# Patient Record
Sex: MALE | Race: WHITE | URBAN - METROPOLITAN AREA
[De-identification: names, ages, dates, MRNs, and addresses within clinical notes are randomized per-mention and may not be internally consistent; named-entity substitution may affect disease eponyms.]

---

## 2023-08-03 LAB
ALANINE AMINOTRANSFERASE (SGPT) (U/L) IN SER/PLAS: 16 U/L (ref 10–52)
ALBUMIN (G/DL) IN SER/PLAS: 3.9 G/DL (ref 3.4–5)
ALKALINE PHOSPHATASE (U/L) IN SER/PLAS: 78 U/L (ref 33–136)
ANION GAP IN SER/PLAS: 15 MMOL/L (ref 10–20)
ASPARTATE AMINOTRANSFERASE (SGOT) (U/L) IN SER/PLAS: 21 U/L (ref 9–39)
BILIRUBIN TOTAL (MG/DL) IN SER/PLAS: 0.8 MG/DL (ref 0–1.2)
CALCIUM (MG/DL) IN SER/PLAS: 8.6 MG/DL (ref 8.6–10.3)
CARBON DIOXIDE, TOTAL (MMOL/L) IN SER/PLAS: 24 MMOL/L (ref 21–32)
CHLORIDE (MMOL/L) IN SER/PLAS: 104 MMOL/L (ref 98–107)
CHOLESTEROL (MG/DL) IN SER/PLAS: 141 MG/DL (ref 0–199)
CHOLESTEROL IN HDL (MG/DL) IN SER/PLAS: 40.2 MG/DL
CHOLESTEROL IN LDL (MG/DL) IN SER/PLAS BY DIRECT ASSAY: 93 MG/DL (ref 0–129)
CHOLESTEROL/HDL RATIO: 3.5
CREATININE (MG/DL) IN SER/PLAS: 1.06 MG/DL (ref 0.5–1.3)
ERYTHROCYTE DISTRIBUTION WIDTH (RATIO) BY AUTOMATED COUNT: 16 % (ref 11.5–14.5)
ERYTHROCYTE MEAN CORPUSCULAR HEMOGLOBIN CONCENTRATION (G/DL) BY AUTOMATED: 31.1 G/DL (ref 32–36)
ERYTHROCYTE MEAN CORPUSCULAR VOLUME (FL) BY AUTOMATED COUNT: 84 FL (ref 80–100)
ERYTHROCYTES (10*6/UL) IN BLOOD BY AUTOMATED COUNT: 4.97 X10E12/L (ref 4.5–5.9)
GFR MALE: 74 ML/MIN/1.73M2
GLUCOSE (MG/DL) IN SER/PLAS: 94 MG/DL (ref 74–99)
HEMATOCRIT (%) IN BLOOD BY AUTOMATED COUNT: 41.8 % (ref 41–52)
HEMOGLOBIN (G/DL) IN BLOOD: 13 G/DL (ref 13.5–17.5)
LDL: 79 MG/DL (ref 0–99)
LEUKOCYTES (10*3/UL) IN BLOOD BY AUTOMATED COUNT: 6.5 X10E9/L (ref 4.4–11.3)
PLATELETS (10*3/UL) IN BLOOD AUTOMATED COUNT: 146 X10E9/L (ref 150–450)
POTASSIUM (MMOL/L) IN SER/PLAS: 4 MMOL/L (ref 3.5–5.3)
PROSTATE SPECIFIC AG (NG/ML) IN SER/PLAS: 3.17 NG/ML (ref 0–4)
PROTEIN TOTAL: 6.4 G/DL (ref 6.4–8.2)
SODIUM (MMOL/L) IN SER/PLAS: 139 MMOL/L (ref 136–145)
TRIGLYCERIDE (MG/DL) IN SER/PLAS: 110 MG/DL (ref 0–149)
UREA NITROGEN (MG/DL) IN SER/PLAS: 10 MG/DL (ref 6–23)
VLDL: 22 MG/DL (ref 0–40)

## 2023-09-20 PROBLEM — F32.A ANXIETY AND DEPRESSION: Status: ACTIVE | Noted: 2023-09-20

## 2023-09-20 PROBLEM — R06.02 SHORTNESS OF BREATH ON EXERTION: Status: ACTIVE | Noted: 2023-09-20

## 2023-09-20 PROBLEM — G47.33 OSA (OBSTRUCTIVE SLEEP APNEA): Status: ACTIVE | Noted: 2023-09-20

## 2023-09-20 PROBLEM — R00.0 TACHYCARDIA: Status: ACTIVE | Noted: 2023-09-20

## 2023-09-20 PROBLEM — E78.5 HYPERLIPIDEMIA: Status: ACTIVE | Noted: 2023-09-20

## 2023-09-20 PROBLEM — F41.9 ANXIETY AND DEPRESSION: Status: ACTIVE | Noted: 2023-09-20

## 2023-09-20 PROBLEM — I10 BENIGN ESSENTIAL HYPERTENSION: Status: ACTIVE | Noted: 2023-09-20

## 2023-09-20 PROBLEM — R94.31 ABNORMAL ECG: Status: ACTIVE | Noted: 2023-09-20

## 2023-09-20 PROBLEM — M10.9 GOUT: Status: ACTIVE | Noted: 2023-09-20

## 2023-09-20 RX ORDER — BUPROPION HYDROCHLORIDE 75 MG/1
75 TABLET ORAL SEE ADMIN INSTRUCTIONS
COMMUNITY
Start: 2020-06-03 | End: 2024-03-14 | Stop reason: WASHOUT

## 2023-09-20 RX ORDER — ASPIRIN 81 MG/1
1 TABLET ORAL DAILY
COMMUNITY
Start: 2020-06-03

## 2023-09-20 RX ORDER — COLCHICINE 0.6 MG/1
0.6 CAPSULE ORAL DAILY
COMMUNITY
Start: 2020-06-03 | End: 2024-03-14 | Stop reason: WASHOUT

## 2023-09-20 RX ORDER — ATORVASTATIN CALCIUM 20 MG/1
1 TABLET, FILM COATED ORAL NIGHTLY
COMMUNITY
Start: 2020-06-22

## 2023-09-20 RX ORDER — DULOXETIN HYDROCHLORIDE 60 MG/1
1 CAPSULE, DELAYED RELEASE ORAL 2 TIMES DAILY
COMMUNITY
Start: 2020-05-11 | End: 2024-03-14 | Stop reason: WASHOUT

## 2023-09-20 RX ORDER — METOPROLOL SUCCINATE 50 MG/1
50 TABLET, EXTENDED RELEASE ORAL 2 TIMES DAILY
COMMUNITY
Start: 2022-01-25 | End: 2024-02-07 | Stop reason: SDUPTHER

## 2023-09-20 RX ORDER — TURMERIC ROOT EXTRACT 500 MG
2000 TABLET ORAL DAILY
COMMUNITY
Start: 2020-06-03 | End: 2024-03-14 | Stop reason: WASHOUT

## 2023-09-20 RX ORDER — TAMSULOSIN HYDROCHLORIDE 0.4 MG/1
0.4 CAPSULE ORAL DAILY
COMMUNITY
Start: 2020-04-27

## 2023-09-20 RX ORDER — LISINOPRIL 40 MG/1
1 TABLET ORAL DAILY
COMMUNITY
Start: 2020-05-06

## 2023-09-20 RX ORDER — AMLODIPINE BESYLATE 2.5 MG/1
1 TABLET ORAL DAILY
COMMUNITY
Start: 2022-01-25 | End: 2024-05-08 | Stop reason: SDUPTHER

## 2023-09-20 RX ORDER — INDOMETHACIN 50 MG/1
50 CAPSULE ORAL DAILY PRN
COMMUNITY
Start: 2020-04-22 | End: 2024-03-14 | Stop reason: WASHOUT

## 2024-02-07 DIAGNOSIS — R00.0 TACHYCARDIA: ICD-10-CM

## 2024-02-07 DIAGNOSIS — I10 BENIGN ESSENTIAL HYPERTENSION: Primary | ICD-10-CM

## 2024-02-07 RX ORDER — MIRTAZAPINE 30 MG/1
30 TABLET, FILM COATED ORAL
COMMUNITY
Start: 2024-01-06 | End: 2024-03-14 | Stop reason: WASHOUT

## 2024-02-07 RX ORDER — CLONAZEPAM 2 MG/1
2 TABLET ORAL DAILY
COMMUNITY
Start: 2024-01-15

## 2024-02-07 RX ORDER — METOPROLOL TARTRATE 50 MG/1
TABLET ORAL
COMMUNITY
Start: 2024-01-30 | End: 2024-03-14 | Stop reason: WASHOUT

## 2024-02-07 RX ORDER — CITALOPRAM 20 MG/1
20 TABLET, FILM COATED ORAL
COMMUNITY
Start: 2024-01-29

## 2024-02-07 RX ORDER — METOPROLOL SUCCINATE 50 MG/1
50 TABLET, EXTENDED RELEASE ORAL 2 TIMES DAILY
Qty: 180 TABLET | Refills: 3 | Status: SHIPPED | OUTPATIENT
Start: 2024-02-07 | End: 2024-03-14 | Stop reason: SDUPTHER

## 2024-02-13 ENCOUNTER — APPOINTMENT (OUTPATIENT)
Dept: CARDIOLOGY | Facility: CLINIC | Age: 74
End: 2024-02-13
Payer: MEDICARE

## 2024-03-07 PROBLEM — H33.20 DETACHED RETINA: Chronic | Status: ACTIVE | Noted: 2021-03-31

## 2024-03-07 RX ORDER — PREDNISONE 10 MG/1
10 TABLET ORAL DAILY
COMMUNITY
End: 2024-03-14 | Stop reason: WASHOUT

## 2024-03-07 RX ORDER — TRIAMCINOLONE ACETONIDE 1 MG/G
CREAM TOPICAL
COMMUNITY
End: 2024-03-14 | Stop reason: WASHOUT

## 2024-03-07 RX ORDER — SIMVASTATIN 40 MG/1
40 TABLET, FILM COATED ORAL
COMMUNITY
End: 2024-03-14 | Stop reason: WASHOUT

## 2024-03-07 RX ORDER — ESZOPICLONE 3 MG/1
3 TABLET, FILM COATED ORAL
COMMUNITY
End: 2024-03-14 | Stop reason: WASHOUT

## 2024-03-07 RX ORDER — TRAZODONE HYDROCHLORIDE 50 MG/1
50 TABLET ORAL NIGHTLY
COMMUNITY
End: 2024-03-14 | Stop reason: WASHOUT

## 2024-03-07 RX ORDER — VERAPAMIL HYDROCHLORIDE 240 MG/1
240 TABLET, FILM COATED, EXTENDED RELEASE ORAL DAILY
COMMUNITY
End: 2024-03-14 | Stop reason: WASHOUT

## 2024-03-07 RX ORDER — TRAMADOL HYDROCHLORIDE 50 MG/1
50 TABLET ORAL EVERY 6 HOURS PRN
COMMUNITY
End: 2024-03-14 | Stop reason: WASHOUT

## 2024-03-11 NOTE — PROGRESS NOTES
"Counseling:  The patient was counseled regarding diagnostic results, instructions for management, risk factor reductions, prognosis, patient and family education, impressions, risks and benefits of treatment options and importance of compliance with treatment.      Chief Complaint:   The patient presents today for overdue annual followup of HTN and dyslipidemia.      History Of Present Illness:    Jhon Blair is a 73 year old male patient who presents today for overdue annual followup of HTN and dyslipidemia. His PMH is significant for anxiety/depression, hyperlipidemia, HTN, PHIL and h/o COVID infection. Over the past year, the patient states that he has done well from a cardiac standpoint. He denies any CP, chest discomfort or SOB. He reports a 2-3 week history of skipped beats when checking his pulse. He denies any palpitations, dizziness or palpitations. He denies increased intake of caffeinated beverages. The patient reports increased psychosocial stressors. He is currently on carbidopa-levodopa for management of restless legs, and in fact this was recently increased from once daily to twice daily. BP has been stable. EKG today shows NSR with occasional PACs. The patient is compliant with his prescribed medications. CBC drawn 08/03/2023 showed a low platelet count of 146. Per the patient, he voluntarily donates platelets on a regular basis.     Last Recorded Vitals:  Vitals:    03/14/24 1116 03/14/24 1132   BP: 104/78    BP Location: Left arm    Pulse: 55 62   Weight: 101 kg (223 lb)    Height: 1.778 m (5' 10\")        Past Surgical History:  He has a past surgical history that includes Other surgical history (07/22/2021).      Social History:  He reports that he has never smoked. He has never used smokeless tobacco. He reports that he does not currently use alcohol. He reports that he does not use drugs.    Family History:  Family History   Problem Relation Name Age of Onset    Heart attack Father        "   Allergies:  Patient has no known allergies.    Outpatient Medications:  Current Outpatient Medications   Medication Instructions    ALPRAZolam (XANAX) 1 mg, oral, Every 8 hours    amLODIPine (Norvasc) 2.5 mg tablet 1 tablet, oral, Daily    aspirin 81 mg EC tablet 1 tablet, oral, Daily    atorvastatin (Lipitor) 20 mg tablet 1 tablet, oral, Nightly    buPROPion (WELLBUTRIN) 75 mg, oral, See admin instructions, TAKE 75 mg in the morning and 100 mg in the evening<BR>    buPROPion SR (WELLBUTRIN SR) 150 mg, oral, 2 times daily    busPIRone (BUSPAR) 10 mg, oral, 2 times daily    carbidopa-levodopa (Sinemet CR)  mg ER tablet 1 tablet, 2 times daily    citalopram (CELEXA) 20 mg, oral, Daily before breakfast    clonazePAM (KLONOPIN) 2 mg, oral, Daily    colchicine (MITIGARE) 0.6 mg, oral, Daily    DULoxetine (Cymbalta) 60 mg DR capsule 1 capsule, oral, 2 times daily    eszopiclone (LUNESTA) 3 mg    indomethacin (INDOCIN) 50 mg, oral, Daily PRN    lisinopril 40 mg tablet 1 tablet, oral, Daily    lisinopril 20 mg, oral, Daily    metoprolol succinate XL (TOPROL-XL) 50 mg, oral, 2 times daily    metoprolol tartrate (Lopressor) 50 mg tablet     mirtazapine (REMERON) 30 mg    NON FORMULARY 2 Doses, oral, Every Day, PhysoTru Dietary supplement 2.4 G    predniSONE (DELTASONE) 10 mg, oral, Daily    QUEtiapine (SEROQUEL) 25 mg, oral    sildenafil (VIAGRA) 100 mg, oral, As needed, 1 HOUR BEFORE SEXUAL ACTIVITY    simvastatin (ZOCOR) 40 mg, oral    tamsulosin (FLOMAX) 0.4 mg, oral, Daily    traMADol (ULTRAM) 50 mg, oral, Every 6 hours PRN    traZODone (DESYREL) 50 mg, oral, Nightly    triamcinolone (Kenalog) 0.1 % cream DONTA EXT AA BID    turmeric root extract 2,000 mg, oral, Daily    verapamil SR (CALAN-SR) 240 mg, oral, Daily    vit C/zinc gluconat/elderberry (SAMBUCUS ELDERBERRY, ZINC GLU, ORAL) 1 capsule, oral, Every Day     Review of Systems   Cardiovascular:         Palpable skipped beats   Psychiatric/Behavioral:           Psychosocial stress   All other systems reviewed and are negative.     Physical Exam:  Constitutional:       Appearance: Healthy appearance. Not in distress.   Neck:      Vascular: No JVR. JVD normal.   Pulmonary:      Effort: Pulmonary effort is normal.      Breath sounds: Normal breath sounds. No wheezing. No rhonchi. No rales.   Chest:      Chest wall: Not tender to palpatation.   Cardiovascular:      PMI at left midclavicular line. Normal rate. Regular rhythm. Normal S1. Normal S2.       Murmurs: There is no murmur.      No gallop.  No click. No rub.   Pulses:     Intact distal pulses.   Edema:     Peripheral edema absent.   Abdominal:      General: Bowel sounds are normal.      Palpations: Abdomen is soft.      Tenderness: There is no abdominal tenderness.   Musculoskeletal: Normal range of motion.         General: No tenderness. Skin:     General: Skin is warm and dry.   Neurological:      General: No focal deficit present.      Mental Status: Alert and oriented to person, place and time.          Last Labs:  CBC -  Lab Results   Component Value Date    WBC 6.5 08/03/2023    HGB 13.0 (L) 08/03/2023    HCT 41.8 08/03/2023    MCV 84 08/03/2023     (L) 08/03/2023       CMP -  Lab Results   Component Value Date    CALCIUM 8.6 08/03/2023    PROT 6.4 08/03/2023    ALBUMIN 3.9 08/03/2023    AST 21 08/03/2023    ALT 16 08/03/2023    ALKPHOS 78 08/03/2023    BILITOT 0.8 08/03/2023       LIPID PANEL -   Lab Results   Component Value Date    CHOL 141 08/03/2023    TRIG 110 08/03/2023    HDL 40.2 08/03/2023    CHHDL 3.5 08/03/2023    LDLF 79 08/03/2023    VLDL 22 08/03/2023       RENAL FUNCTION PANEL -   Lab Results   Component Value Date    GLUCOSE 94 08/03/2023     08/03/2023    K 4.0 08/03/2023     08/03/2023    CO2 24 08/03/2023    ANIONGAP 15 08/03/2023    BUN 10 08/03/2023    CREATININE 1.06 08/03/2023    GFRMALE 74 08/03/2023    CALCIUM 8.6 08/03/2023    ALBUMIN 3.9 08/03/2023          Last  Cardiology Tests:  07/10/2020 - NM Cardiac Stress Test  1. Probably normal myocardial perfusion scan with no ischemia seen. Some diaphragm attenuation artifact along the inferior wall is noted. There is well-preserved LV function.  2. EKG Portion: No clinical or electrocardiographic evidence for ischemia at maximal infusion. Normal Stress Test.     07/02/2020 - CT Cardiac Scoring  1. Coronary artery calcium score of 23.  2. Few small right-sided pulmonary nodules measuring up to 5 mm, likely benign. No further follow-up is required, however, if the patient has high risk factors for primary lung malignancy, follow-up noncontrast CT scan chest in 12 months may be obtained.      06/23/2020 - TTE  1. The left ventricular systolic function is normal with a 65% estimated ejection fraction.  2. The left ventricular posterior wall thickness is moderately increased.  3. RVSP within normal limits.  4. Aortic valve stenosis is not present.     04/02/20- CT Chest Abd Pelv- Patchy groundglass infiltrates. This is nonspecific but could be indicative of a viral pneumonia. 4mm right middle lobe lung nodule. This is too small to further characterize. In a low risk patient. Enlarged pulmonary arteries consistent with chronic pulmonary hypertension. Residual contrast in the colon. Cholelithiasis. The gallbladder wall remains normal. Bilateral renal cysts.     Lab review: I have personally reviewed the laboratory result(s).    Assessment/Plan   1) HTN  Stable  Continue lisinopril 40 mg daily, metoprolol succinate XL 50 mg BID, amlodipine 2.5 mg daily in the evening  Decrease metoprolol from BID to once daily s/t bradycardia      2) Exercise Intolerance s/p COVID Infection with Tachycardia  Echo with no structural heart disease     3) Risk Factors for CAD  CT Calcium Score was 23 in July 2020, but stress test negative for ischemia July 2020  Continue atorvastatin 20 mg daily, lisinopril 40 mg daily, metoprolol succinate XL 50 mg BID,  amlodipine 2.5 mg daily in the evening  Lipid panel 08/03/2023 with LDL of 79   CBC 08/03/2023 with low platelets of 146 - voluntarily donates platelets on a regular basis  Denies CP, chest discomfort or SOB  Reports skipped beats when checking pulse  Denies palpitations, dizziness, lightheadedness  Denies increased caffeine intake  On carbidopa-levodopa for restless legs - recently increased from once daily to twice daily   Recommend discussing with PCP about switching from carbidopa-levodopa to Requip   Decrease metoprolol from BID to once daily in the evening  Check 7-day Holter  F/U 1 month       Scribe Attestation  By signing my name below, I, Tyshawn Mckee   attest that this documentation has been prepared under the direction and in the presence of Rylan Razo MD.

## 2024-03-12 ENCOUNTER — TELEPHONE (OUTPATIENT)
Dept: CARDIOLOGY | Facility: CLINIC | Age: 74
End: 2024-03-12
Payer: MEDICARE

## 2024-03-12 NOTE — TELEPHONE ENCOUNTER
----- Message from Rosa Vivas sent at 3/12/2024  2:44 PM EDT -----  Regarding: Skipping Beats  Hi Georgina - patient is being seen by Dr. Razo this Thurs 3/14 for an annual follow up. He said his heart has been skipping beats and wanted to know if he should come in for an EKG prior to his appt on 3/14. He said the [facility] hooked him up to an EKG machine prior to donating platelets and that's how they discovered that his heart skipped 11 times in 60 seconds. I said I would inform Dr. Razo's team and if it was concerning we'd call with recommendation. Thank you

## 2024-03-13 NOTE — TELEPHONE ENCOUNTER
Patient's phone # has been disconnected. Unable to reach patient and no MyChart access. Will discuss at appointment tomorrow.

## 2024-03-14 ENCOUNTER — ANCILLARY PROCEDURE (OUTPATIENT)
Dept: CARDIOLOGY | Facility: CLINIC | Age: 74
End: 2024-03-14
Payer: MEDICARE

## 2024-03-14 ENCOUNTER — OFFICE VISIT (OUTPATIENT)
Dept: CARDIOLOGY | Facility: CLINIC | Age: 74
End: 2024-03-14
Payer: MEDICARE

## 2024-03-14 VITALS
SYSTOLIC BLOOD PRESSURE: 104 MMHG | WEIGHT: 223 LBS | HEIGHT: 70 IN | HEART RATE: 62 BPM | DIASTOLIC BLOOD PRESSURE: 78 MMHG | BODY MASS INDEX: 31.92 KG/M2

## 2024-03-14 DIAGNOSIS — R00.0 TACHYCARDIA: ICD-10-CM

## 2024-03-14 DIAGNOSIS — I10 BENIGN ESSENTIAL HYPERTENSION: ICD-10-CM

## 2024-03-14 DIAGNOSIS — I10 BENIGN ESSENTIAL HYPERTENSION: Primary | ICD-10-CM

## 2024-03-14 PROCEDURE — 1160F RVW MEDS BY RX/DR IN RCRD: CPT | Performed by: INTERNAL MEDICINE

## 2024-03-14 PROCEDURE — 3078F DIAST BP <80 MM HG: CPT | Performed by: INTERNAL MEDICINE

## 2024-03-14 PROCEDURE — 1036F TOBACCO NON-USER: CPT | Performed by: INTERNAL MEDICINE

## 2024-03-14 PROCEDURE — 1159F MED LIST DOCD IN RCRD: CPT | Performed by: INTERNAL MEDICINE

## 2024-03-14 PROCEDURE — 99213 OFFICE O/P EST LOW 20 MIN: CPT | Performed by: INTERNAL MEDICINE

## 2024-03-14 PROCEDURE — 93000 ELECTROCARDIOGRAM COMPLETE: CPT | Performed by: INTERNAL MEDICINE

## 2024-03-14 PROCEDURE — 3074F SYST BP LT 130 MM HG: CPT | Performed by: INTERNAL MEDICINE

## 2024-03-14 RX ORDER — METOPROLOL SUCCINATE 50 MG/1
50 TABLET, EXTENDED RELEASE ORAL DAILY
Qty: 90 TABLET | Refills: 3 | Status: SHIPPED | OUTPATIENT
Start: 2024-03-14 | End: 2025-03-14

## 2024-03-14 ASSESSMENT — ENCOUNTER SYMPTOMS
LOSS OF SENSATION IN FEET: 0
OCCASIONAL FEELINGS OF UNSTEADINESS: 0
DEPRESSION: 1

## 2024-03-14 NOTE — LETTER
March 14, 2024     Clayton Wayne Seiple, DO  [address]  [facility]  [facility] [address]  [facility] [address]    Patient: Jhon Blair   YOB: 1950   Date of Visit: 3/14/2024       Dear Dr. Clayton Wayne Seiple, DO:    Thank you for referring Jhon Blair to me for evaluation. Below are my notes for this consultation.  If you have questions, please do not hesitate to call me. I look forward to following your patient along with you.       Sincerely,     Rylan Razo MD      CC: No Recipients  ______________________________________________________________________________________    Counseling:  The patient was counseled regarding diagnostic results, instructions for management, risk factor reductions, prognosis, patient and family education, impressions, risks and benefits of treatment options and importance of compliance with treatment.      Chief Complaint:   The patient presents today for overdue annual followup of HTN and dyslipidemia.      History Of Present Illness:    Jhon Blair is a 73 year old male patient who presents today for overdue annual followup of HTN and dyslipidemia. His PMH is significant for anxiety/depression, hyperlipidemia, HTN, PHIL and h/o COVID infection. Over the past year, the patient states that he has done well from a cardiac standpoint. He denies any CP, chest discomfort or SOB. He reports a 2-3 week history of skipped beats when checking his pulse. He denies any palpitations, dizziness or palpitations. He denies increased intake of caffeinated beverages. The patient reports increased psychosocial stressors. He is currently on carbidopa-levodopa for management of restless legs, and in fact this was recently increased from once daily to twice daily. BP has been stable. EKG today shows NSR with occasional PACs. The patient is compliant with his prescribed medications. CBC drawn 08/03/2023 showed a low platelet count of 146. Per the patient, he voluntarily donates platelets on a  "regular basis.     Last Recorded Vitals:  Vitals:    03/14/24 1116 03/14/24 1132   BP: 104/78    BP Location: Left arm    Pulse: 55 62   Weight: 101 kg (223 lb)    Height: 1.778 m (5' 10\")        Past Surgical History:  He has a past surgical history that includes Other surgical history (07/22/2021).      Social History:  He reports that he has never smoked. He has never used smokeless tobacco. He reports that he does not currently use alcohol. He reports that he does not use drugs.    Family History:  Family History   Problem Relation Name Age of Onset   • Heart attack Father          Allergies:  Patient has no known allergies.    Outpatient Medications:  Current Outpatient Medications   Medication Instructions   • ALPRAZolam (XANAX) 1 mg, oral, Every 8 hours   • amLODIPine (Norvasc) 2.5 mg tablet 1 tablet, oral, Daily   • aspirin 81 mg EC tablet 1 tablet, oral, Daily   • atorvastatin (Lipitor) 20 mg tablet 1 tablet, oral, Nightly   • buPROPion (WELLBUTRIN) 75 mg, oral, See admin instructions, TAKE 75 mg in the morning and 100 mg in the evening<BR>   • buPROPion SR (WELLBUTRIN SR) 150 mg, oral, 2 times daily   • busPIRone (BUSPAR) 10 mg, oral, 2 times daily   • carbidopa-levodopa (Sinemet CR)  mg ER tablet 1 tablet, 2 times daily   • citalopram (CELEXA) 20 mg, oral, Daily before breakfast   • clonazePAM (KLONOPIN) 2 mg, oral, Daily   • colchicine (MITIGARE) 0.6 mg, oral, Daily   • DULoxetine (Cymbalta) 60 mg DR capsule 1 capsule, oral, 2 times daily   • eszopiclone (LUNESTA) 3 mg   • indomethacin (INDOCIN) 50 mg, oral, Daily PRN   • lisinopril 40 mg tablet 1 tablet, oral, Daily   • lisinopril 20 mg, oral, Daily   • metoprolol succinate XL (TOPROL-XL) 50 mg, oral, 2 times daily   • metoprolol tartrate (Lopressor) 50 mg tablet    • mirtazapine (REMERON) 30 mg   • NON FORMULARY 2 Doses, oral, Every Day, PhysoTru Dietary supplement 2.4 G   • predniSONE (DELTASONE) 10 mg, oral, Daily   • QUEtiapine (SEROQUEL) 25 " mg, oral   • sildenafil (VIAGRA) 100 mg, oral, As needed, 1 HOUR BEFORE SEXUAL ACTIVITY   • simvastatin (ZOCOR) 40 mg, oral   • tamsulosin (FLOMAX) 0.4 mg, oral, Daily   • traMADol (ULTRAM) 50 mg, oral, Every 6 hours PRN   • traZODone (DESYREL) 50 mg, oral, Nightly   • triamcinolone (Kenalog) 0.1 % cream DONTA EXT AA BID   • turmeric root extract 2,000 mg, oral, Daily   • verapamil SR (CALAN-SR) 240 mg, oral, Daily   • vit C/zinc gluconat/elderberry (SAMBUCUS ELDERBERRY, ZINC GLU, ORAL) 1 capsule, oral, Every Day     Review of Systems   Cardiovascular:         Palpable skipped beats   Psychiatric/Behavioral:          Psychosocial stress   All other systems reviewed and are negative.     Physical Exam:  Constitutional:       Appearance: Healthy appearance. Not in distress.   Neck:      Vascular: No JVR. JVD normal.   Pulmonary:      Effort: Pulmonary effort is normal.      Breath sounds: Normal breath sounds. No wheezing. No rhonchi. No rales.   Chest:      Chest wall: Not tender to palpatation.   Cardiovascular:      PMI at left midclavicular line. Normal rate. Regular rhythm. Normal S1. Normal S2.       Murmurs: There is no murmur.      No gallop.  No click. No rub.   Pulses:     Intact distal pulses.   Edema:     Peripheral edema absent.   Abdominal:      General: Bowel sounds are normal.      Palpations: Abdomen is soft.      Tenderness: There is no abdominal tenderness.   Musculoskeletal: Normal range of motion.         General: No tenderness. Skin:     General: Skin is warm and dry.   Neurological:      General: No focal deficit present.      Mental Status: Alert and oriented to person, place and time.          Last Labs:  CBC -  Lab Results   Component Value Date    WBC 6.5 08/03/2023    HGB 13.0 (L) 08/03/2023    HCT 41.8 08/03/2023    MCV 84 08/03/2023     (L) 08/03/2023       CMP -  Lab Results   Component Value Date    CALCIUM 8.6 08/03/2023    PROT 6.4 08/03/2023    ALBUMIN 3.9 08/03/2023    AST 21  08/03/2023    ALT 16 08/03/2023    ALKPHOS 78 08/03/2023    BILITOT 0.8 08/03/2023       LIPID PANEL -   Lab Results   Component Value Date    CHOL 141 08/03/2023    TRIG 110 08/03/2023    HDL 40.2 08/03/2023    CHHDL 3.5 08/03/2023    LDLF 79 08/03/2023    VLDL 22 08/03/2023       RENAL FUNCTION PANEL -   Lab Results   Component Value Date    GLUCOSE 94 08/03/2023     08/03/2023    K 4.0 08/03/2023     08/03/2023    CO2 24 08/03/2023    ANIONGAP 15 08/03/2023    BUN 10 08/03/2023    CREATININE 1.06 08/03/2023    GFRMALE 74 08/03/2023    CALCIUM 8.6 08/03/2023    ALBUMIN 3.9 08/03/2023          Last Cardiology Tests:  07/10/2020 - NM Cardiac Stress Test  1. Probably normal myocardial perfusion scan with no ischemia seen. Some diaphragm attenuation artifact along the inferior wall is noted. There is well-preserved LV function.  2. EKG Portion: No clinical or electrocardiographic evidence for ischemia at maximal infusion. Normal Stress Test.     07/02/2020 - CT Cardiac Scoring  1. Coronary artery calcium score of 23.  2. Few small right-sided pulmonary nodules measuring up to 5 mm, likely benign. No further follow-up is required, however, if the patient has high risk factors for primary lung malignancy, follow-up noncontrast CT scan chest in 12 months may be obtained.      06/23/2020 - TTE  1. The left ventricular systolic function is normal with a 65% estimated ejection fraction.  2. The left ventricular posterior wall thickness is moderately increased.  3. RVSP within normal limits.  4. Aortic valve stenosis is not present.     04/02/20- CT Chest Abd Pelv- Patchy groundglass infiltrates. This is nonspecific but could be indicative of a viral pneumonia. 4mm right middle lobe lung nodule. This is too small to further characterize. In a low risk patient. Enlarged pulmonary arteries consistent with chronic pulmonary hypertension. Residual contrast in the colon. Cholelithiasis. The gallbladder wall remains  normal. Bilateral renal cysts.     Lab review: I have personally reviewed the laboratory result(s).    Assessment/Plan  1) HTN  Stable  Continue lisinopril 40 mg daily, metoprolol succinate XL 50 mg BID, amlodipine 2.5 mg daily in the evening  Decrease metoprolol from BID to once daily s/t bradycardia      2) Exercise Intolerance s/p COVID Infection with Tachycardia  Echo with no structural heart disease     3) Risk Factors for CAD  CT Calcium Score was 23 in July 2020, but stress test negative for ischemia July 2020  Continue atorvastatin 20 mg daily, lisinopril 40 mg daily, metoprolol succinate XL 50 mg BID, amlodipine 2.5 mg daily in the evening  Lipid panel 08/03/2023 with LDL of 79   CBC 08/03/2023 with low platelets of 146 - voluntarily donates platelets on a regular basis  Denies CP, chest discomfort or SOB  Reports skipped beats when checking pulse  Denies palpitations, dizziness, lightheadedness  Denies increased caffeine intake  On carbidopa-levodopa for restless legs - recently increased from once daily to twice daily   Recommend discussing with PCP about switching from carbidopa-levodopa to Requip   Decrease metoprolol from BID to once daily in the evening  Check 7-day Holter  F/U 1 month       Scribe Attestation  By signing my name below, I, Tyshawn Mckee   attest that this documentation has been prepared under the direction and in the presence of Rylan Razo MD.

## 2024-03-14 NOTE — PATIENT INSTRUCTIONS
Decrease metoprolol from twice daily to once daily in the evening. A prescription for the new dosing frequency has been sent to your pharmacy.   Continue all other medications as prescribed. Dr. Razo has recommended that you discuss with your primary care provider about switching from carbidopa-levodopa to an alternative such as Requip.   Dr. Razo has ordered a heart monitor to assess your heart rhythm.   Followup with Dr. Razo in 1 month.    If you have any questions or cardiac concerns, please call our office at [phone].

## 2024-03-27 LAB — BODY SURFACE AREA: 2.24 M2

## 2024-03-28 ENCOUNTER — TELEPHONE (OUTPATIENT)
Dept: CARDIOLOGY | Facility: CLINIC | Age: 74
End: 2024-03-28
Payer: MEDICARE

## 2024-03-28 RX ORDER — ACETAMINOPHEN 500 MG
1000 TABLET ORAL NIGHTLY
COMMUNITY

## 2024-03-28 RX ORDER — CARBIDOPA AND LEVODOPA 50; 200 MG/1; MG/1
1 TABLET, EXTENDED RELEASE ORAL NIGHTLY
COMMUNITY
End: 2024-04-15 | Stop reason: WASHOUT

## 2024-04-14 NOTE — PROGRESS NOTES
"Counseling:  The patient was counseled regarding diagnostic results, instructions for management, risk factor reductions, prognosis, patient and family education, impressions, risks and benefits of treatment options and importance of compliance with treatment.      Chief Complaint:   The patient presents today for 1-month followup of bradycardia and skipped beats s/p Holter monitor.     History Of Present Illness:    Jhon Blair is a 73 year old male patient who presents today for 1-month followup of bradycardia and skipped beats s/p Holter monitor. His PMH is significant for anxiety/depression, hyperlipidemia, HTN, PHIL and h/o COVID infection. Holter monitoring performed from 03/14/2024 to 03/21/2024 revealed 30 runs of SVT, SVE burden of 13.2% and VE burden of 3%. Today, the patient states that he feels improved since last being seen, reporting that his bradycardia has improved with the reduction in his metoprolol dose. He has also been switched from carbidopa-levodopa to Requip, which has been just as effective in managing his restless legs. He reports still experiencing skipped beats, but denies any palpitations or fluttering.      Last Recorded Vitals:  Vitals:    04/15/24 1313   BP: 136/86   BP Location: Left arm   Pulse: 56   Weight: 104 kg (229 lb)   Height: 1.778 m (5' 10\")       Past Surgical History:  He has a past surgical history that includes Other surgical history (07/22/2021).      Social History:  He reports that he has never smoked. He has never used smokeless tobacco. He reports that he does not currently use alcohol. He reports that he does not use drugs.    Family History:  Family History   Problem Relation Name Age of Onset    Heart attack Father          Allergies:  Patient has no known allergies.    Outpatient Medications:  Current Outpatient Medications   Medication Instructions    acetaminophen (TYLENOL) 1,000 mg, oral, Nightly    amLODIPine (Norvasc) 2.5 mg tablet 1 tablet, oral, Daily    " aspirin 81 mg EC tablet 1 tablet, oral, Daily    atorvastatin (Lipitor) 20 mg tablet 1 tablet, oral, Nightly    busPIRone (BUSPAR) 10 mg, oral, 2 times daily    carbidopa-levodopa (Sinemet CR)  mg ER tablet 1 tablet, oral, Nightly, Do not crush or chew.    citalopram (CELEXA) 20 mg, oral, Daily before breakfast    clonazePAM (KLONOPIN) 2 mg, oral, Daily    indomethacin (Indocin) 50 mg capsule TAKE 1 CAPSULE BY MOUTH WITH FOOD OR MILK TWICE DAILY WITH GOUTY SPELL    lisinopril 40 mg tablet 1 tablet, oral, Daily    metoprolol succinate XL (TOPROL-XL) 50 mg, oral, Daily    QUEtiapine (SEROQUEL) 25 mg, oral    rOPINIRole (Requip) 0.5 mg tablet TAKE 1 TABLET BY MOUTH EVERY NIGHT 1 TO 3 HOURS BEFORE BEDTIME    tamsulosin (FLOMAX) 0.4 mg, oral, Daily     Review of Systems   Cardiovascular:  Positive for irregular heartbeat (described as skipped beats).   All other systems reviewed and are negative.     Physical Exam:  Constitutional:       Appearance: Healthy appearance. Not in distress.   Neck:      Vascular: No JVR. JVD normal.   Pulmonary:      Effort: Pulmonary effort is normal.      Breath sounds: Normal breath sounds. No wheezing. No rhonchi. No rales.   Chest:      Chest wall: Not tender to palpatation.   Cardiovascular:      PMI at left midclavicular line. Normal rate. Regular rhythm. Normal S1. Normal S2.       Murmurs: There is no murmur.      No gallop.  No click. No rub.   Pulses:     Intact distal pulses.   Edema:     Peripheral edema absent.   Abdominal:      General: Bowel sounds are normal.      Palpations: Abdomen is soft.      Tenderness: There is no abdominal tenderness.   Musculoskeletal: Normal range of motion.         General: No tenderness. Skin:     General: Skin is warm and dry.   Neurological:      General: No focal deficit present.      Mental Status: Alert and oriented to person, place and time.          Last Labs:  CBC -  Lab Results   Component Value Date    WBC 6.5 08/03/2023    HGB  13.0 (L) 08/03/2023    HCT 41.8 08/03/2023    MCV 84 08/03/2023     (L) 08/03/2023       CMP -  Lab Results   Component Value Date    CALCIUM 8.6 08/03/2023    PROT 6.4 08/03/2023    ALBUMIN 3.9 08/03/2023    AST 21 08/03/2023    ALT 16 08/03/2023    ALKPHOS 78 08/03/2023    BILITOT 0.8 08/03/2023       LIPID PANEL -   Lab Results   Component Value Date    CHOL 141 08/03/2023    TRIG 110 08/03/2023    HDL 40.2 08/03/2023    CHHDL 3.5 08/03/2023    LDLF 79 08/03/2023    VLDL 22 08/03/2023       RENAL FUNCTION PANEL -   Lab Results   Component Value Date    GLUCOSE 94 08/03/2023     08/03/2023    K 4.0 08/03/2023     08/03/2023    CO2 24 08/03/2023    ANIONGAP 15 08/03/2023    BUN 10 08/03/2023    CREATININE 1.06 08/03/2023    GFRMALE 74 08/03/2023    CALCIUM 8.6 08/03/2023    ALBUMIN 3.9 08/03/2023      Last Cardiology Tests:  03/14/2024 to 03/21/2024 - Holter Monitor  1. Predominant underlying rhythm was sinus rhythm; min HR 41 bpm, max  bpm, avg HR 56 bpm.  2. 30 supraventricular tachycardia runs occurred; fastest interval lasting 9 beats with max rate 164 bpm, longest lasting 12.1 seconds with avg rate 119 bpm.  3. Isolated SVEs were frequent (13.2%), SVE couplets were rare, and SVE triplets were rare.  4. Isolated VEs were occasional (3.0%), and no VE couplets or VE triplets were present.    07/10/2020 - NM Cardiac Stress Test  1. Probably normal myocardial perfusion scan with no ischemia seen. Some diaphragm attenuation artifact along the inferior wall is noted. There is well-preserved LV function.  2. EKG Portion: No clinical or electrocardiographic evidence for ischemia at maximal infusion. Normal Stress Test.     07/02/2020 - CT Cardiac Scoring  1. Coronary artery calcium score of 23.  2. Few small right-sided pulmonary nodules measuring up to 5 mm, likely benign. No further follow-up is required, however, if the patient has high risk factors for primary lung malignancy, follow-up  noncontrast CT scan chest in 12 months may be obtained.      06/23/2020 - TTE  1. The left ventricular systolic function is normal with a 65% estimated ejection fraction.  2. The left ventricular posterior wall thickness is moderately increased.  3. RVSP within normal limits.  4. Aortic valve stenosis is not present.     04/02/20- CT Chest Abd Pelv- Patchy groundglass infiltrates. This is nonspecific but could be indicative of a viral pneumonia. 4mm right middle lobe lung nodule. This is too small to further characterize. In a low risk patient. Enlarged pulmonary arteries consistent with chronic pulmonary hypertension. Residual contrast in the colon. Cholelithiasis. The gallbladder wall remains normal. Bilateral renal cysts.     Diagnostic review: I have personally reviewed the result(s) of the Holter Monitor.     Assessment/Plan   1) HTN  Stable  Continue lisinopril 40 mg daily, metoprolol succinate 50 mg once daily, amlodipine 2.5 mg daily in the evening  Metoprolol previously decreased from BID to once daily s/t bradycardia   Bradycardia improved      2) Exercise Intolerance s/p COVID Infection with Tachycardia  Echo with no structural heart disease     3) Risk Factors for CAD - Ventricular Arrhythmia  CT Calcium Score was 23 in July 2020, but stress test negative for ischemia July 2020  Continue ASA 81 mg daily, atorvastatin 20 mg daily, lisinopril 40 mg daily, metoprolol succinate 50 mg daily, amlodipine 2.5 mg daily in the evening.  Lipid panel 08/03/2023 with LDL of 79   CBC 08/03/2023 with low platelets of 146 - voluntarily donates platelets on a regular basis  Denies CP, chest discomfort or SOB  On carbidopa-levodopa for restless legs - recently increased from once daily to twice daily   Recommend discussing with PCP about switching from carbidopa-levodopa to Requip   Metoprolol previously decreased from BID to once daily s/t bradycardia   Holter with SVT, SVE burden of 13.2% and VE burden of  3%  Bradycardia improved  Carbidopa-levodopa has been switched to Requip   Reports persistent skipped beats, denies palpitations or fluttering   Letter provided to clear patient for blood/plasma/platelet donation   Repeat echo 6 months  Repeat CT calcium score 6 months  F/U 6 months      Scribe Attestation  By signing my name below, I, Tyshawn Mckee   attest that this documentation has been prepared under the direction and in the presence of Rylan Razo MD.

## 2024-04-15 ENCOUNTER — OFFICE VISIT (OUTPATIENT)
Dept: CARDIOLOGY | Facility: CLINIC | Age: 74
End: 2024-04-15
Payer: MEDICARE

## 2024-04-15 VITALS
WEIGHT: 229 LBS | HEART RATE: 56 BPM | BODY MASS INDEX: 32.78 KG/M2 | DIASTOLIC BLOOD PRESSURE: 86 MMHG | SYSTOLIC BLOOD PRESSURE: 136 MMHG | HEIGHT: 70 IN

## 2024-04-15 DIAGNOSIS — Z01.810 VENTRICULAR ECTOPY/ARRHYTHMIA, PRE-OPERATIVE CARDIOVASCULAR EXAM: ICD-10-CM

## 2024-04-15 DIAGNOSIS — I49.3 VENTRICULAR ECTOPY/ARRHYTHMIA, PRE-OPERATIVE CARDIOVASCULAR EXAM: ICD-10-CM

## 2024-04-15 DIAGNOSIS — I10 BENIGN ESSENTIAL HYPERTENSION: Primary | ICD-10-CM

## 2024-04-15 PROCEDURE — 1159F MED LIST DOCD IN RCRD: CPT | Performed by: INTERNAL MEDICINE

## 2024-04-15 PROCEDURE — 3075F SYST BP GE 130 - 139MM HG: CPT | Performed by: INTERNAL MEDICINE

## 2024-04-15 PROCEDURE — 99213 OFFICE O/P EST LOW 20 MIN: CPT | Performed by: INTERNAL MEDICINE

## 2024-04-15 PROCEDURE — 3079F DIAST BP 80-89 MM HG: CPT | Performed by: INTERNAL MEDICINE

## 2024-04-15 PROCEDURE — 1036F TOBACCO NON-USER: CPT | Performed by: INTERNAL MEDICINE

## 2024-04-15 PROCEDURE — 1160F RVW MEDS BY RX/DR IN RCRD: CPT | Performed by: INTERNAL MEDICINE

## 2024-04-15 RX ORDER — ROPINIROLE 0.5 MG/1
TABLET, FILM COATED ORAL
COMMUNITY
Start: 2024-04-03

## 2024-04-15 RX ORDER — INDOMETHACIN 50 MG/1
CAPSULE ORAL
COMMUNITY
Start: 2024-04-05

## 2024-04-15 ASSESSMENT — ENCOUNTER SYMPTOMS
IRREGULAR HEARTBEAT: 1
DEPRESSION: 1
LOSS OF SENSATION IN FEET: 0
OCCASIONAL FEELINGS OF UNSTEADINESS: 0

## 2024-04-15 NOTE — LETTER
April 15, 2024     Patient: Jhon Blair   YOB: 1950   Date of Visit: 4/15/2024       To Whom It May Concern:    It is my medical opinion that Jhon Blair can donate blood products. No contraindication from cardiac standpoint    If you have any questions or concerns, please don't hesitate to call.         Sincerely,        Rylan Razo MD    CC: No Recipients

## 2024-04-15 NOTE — PATIENT INSTRUCTIONS
Continue all current medications as prescribed.  Dr. Razo has provided you with a letter to clear you for donating blood/plasma/platelets.  Repeat echocardiogram (ultrasound of the heart) in 6 months to followup on your heart function and structure.  Repeat CT calcium score in 6 months.  Followup with Dr. Razo in 6 months.    If you have any questions or cardiac concerns, please call our office at [phone].

## 2024-05-08 DIAGNOSIS — I10 BENIGN ESSENTIAL HYPERTENSION: Primary | ICD-10-CM

## 2024-05-08 RX ORDER — AMLODIPINE BESYLATE 2.5 MG/1
2.5 TABLET ORAL DAILY
Qty: 90 TABLET | Refills: 3 | Status: SHIPPED | OUTPATIENT
Start: 2024-05-08

## 2024-06-04 DIAGNOSIS — I10 BENIGN ESSENTIAL HYPERTENSION: ICD-10-CM

## 2024-06-04 RX ORDER — AMLODIPINE BESYLATE 2.5 MG/1
2.5 TABLET ORAL DAILY
Qty: 90 TABLET | Refills: 1 | OUTPATIENT
Start: 2024-06-04 | End: 2024-12-01

## 2024-06-04 NOTE — TELEPHONE ENCOUNTER
Jt sent 90 days with 3 refills about 1 month ago.  I called and confirmed with [facility] in [place] and they have it on file and will get it ready for patient.

## 2024-09-26 ENCOUNTER — TELEPHONE (OUTPATIENT)
Dept: CARDIOLOGY | Facility: HOSPITAL | Age: 74
End: 2024-09-26
Payer: MEDICARE

## 2024-09-26 DIAGNOSIS — I10 BENIGN ESSENTIAL HYPERTENSION: ICD-10-CM

## 2024-09-26 RX ORDER — AMLODIPINE BESYLATE 5 MG/1
5 TABLET ORAL DAILY
Qty: 30 TABLET | Refills: 0 | Status: SHIPPED | OUTPATIENT
Start: 2024-09-26 | End: 2024-10-26

## 2024-09-26 NOTE — TELEPHONE ENCOUNTER
RN called pt back at this time regarding high bloodpressure readings. Pt has been keeping a log and read them off to RN. 180's/100's.    RN was notified by Dr. Razo for a verbal order to change Increase Amlodipine to 5 mg a day and if still high in a few days then to 10 mg a day. RN placed a new order for Dr. Razo to sign. Pt verbalized understanding.

## 2024-09-26 NOTE — TELEPHONE ENCOUNTER
Patient called in, has recently been experiencing increasing blood pressures.   Has been running diastolic 160-170 over  100 diastolic. Wanted to discuss with care team, about medication evaluation.

## 2024-09-30 DIAGNOSIS — I10 BENIGN ESSENTIAL HYPERTENSION: ICD-10-CM

## 2024-09-30 RX ORDER — AMLODIPINE BESYLATE 5 MG/1
5 TABLET ORAL DAILY
Qty: 30 TABLET | Refills: 0 | Status: SHIPPED | OUTPATIENT
Start: 2024-09-30 | End: 2024-10-02 | Stop reason: SDUPTHER

## 2024-10-02 ENCOUNTER — OFFICE VISIT (OUTPATIENT)
Dept: CARDIOLOGY | Facility: HOSPITAL | Age: 74
End: 2024-10-02
Payer: MEDICARE

## 2024-10-02 ENCOUNTER — LAB (OUTPATIENT)
Dept: LAB | Facility: LAB | Age: 74
End: 2024-10-02
Payer: MEDICARE

## 2024-10-02 ENCOUNTER — TELEPHONE (OUTPATIENT)
Dept: CARDIOLOGY | Facility: HOSPITAL | Age: 74
End: 2024-10-02
Payer: MEDICARE

## 2024-10-02 VITALS
DIASTOLIC BLOOD PRESSURE: 106 MMHG | OXYGEN SATURATION: 94 % | WEIGHT: 221 LBS | HEART RATE: 99 BPM | SYSTOLIC BLOOD PRESSURE: 142 MMHG | HEIGHT: 70 IN | BODY MASS INDEX: 31.64 KG/M2

## 2024-10-02 DIAGNOSIS — E78.2 MIXED HYPERLIPIDEMIA: ICD-10-CM

## 2024-10-02 DIAGNOSIS — I10 BENIGN ESSENTIAL HYPERTENSION: ICD-10-CM

## 2024-10-02 DIAGNOSIS — R00.0 TACHYCARDIA: ICD-10-CM

## 2024-10-02 DIAGNOSIS — I10 BENIGN ESSENTIAL HYPERTENSION: Primary | ICD-10-CM

## 2024-10-02 LAB
ANION GAP SERPL CALC-SCNC: 12 MMOL/L (ref 10–20)
BUN SERPL-MCNC: 14 MG/DL (ref 6–23)
CALCIUM SERPL-MCNC: 9.5 MG/DL (ref 8.6–10.3)
CHLORIDE SERPL-SCNC: 104 MMOL/L (ref 98–107)
CO2 SERPL-SCNC: 27 MMOL/L (ref 21–32)
CREAT SERPL-MCNC: 0.88 MG/DL (ref 0.5–1.3)
EGFRCR SERPLBLD CKD-EPI 2021: 90 ML/MIN/1.73M*2
GLUCOSE SERPL-MCNC: 100 MG/DL (ref 74–99)
POTASSIUM SERPL-SCNC: 3.5 MMOL/L (ref 3.5–5.3)
SODIUM SERPL-SCNC: 139 MMOL/L (ref 136–145)

## 2024-10-02 PROCEDURE — 1036F TOBACCO NON-USER: CPT | Performed by: STUDENT IN AN ORGANIZED HEALTH CARE EDUCATION/TRAINING PROGRAM

## 2024-10-02 PROCEDURE — 1159F MED LIST DOCD IN RCRD: CPT | Performed by: STUDENT IN AN ORGANIZED HEALTH CARE EDUCATION/TRAINING PROGRAM

## 2024-10-02 PROCEDURE — 99214 OFFICE O/P EST MOD 30 MIN: CPT | Performed by: STUDENT IN AN ORGANIZED HEALTH CARE EDUCATION/TRAINING PROGRAM

## 2024-10-02 PROCEDURE — 80048 BASIC METABOLIC PNL TOTAL CA: CPT

## 2024-10-02 PROCEDURE — 36415 COLL VENOUS BLD VENIPUNCTURE: CPT

## 2024-10-02 PROCEDURE — 3077F SYST BP >= 140 MM HG: CPT | Performed by: STUDENT IN AN ORGANIZED HEALTH CARE EDUCATION/TRAINING PROGRAM

## 2024-10-02 PROCEDURE — 93010 ELECTROCARDIOGRAM REPORT: CPT | Performed by: STUDENT IN AN ORGANIZED HEALTH CARE EDUCATION/TRAINING PROGRAM

## 2024-10-02 PROCEDURE — 93005 ELECTROCARDIOGRAM TRACING: CPT | Performed by: STUDENT IN AN ORGANIZED HEALTH CARE EDUCATION/TRAINING PROGRAM

## 2024-10-02 PROCEDURE — 3080F DIAST BP >= 90 MM HG: CPT | Performed by: STUDENT IN AN ORGANIZED HEALTH CARE EDUCATION/TRAINING PROGRAM

## 2024-10-02 RX ORDER — AMLODIPINE BESYLATE 10 MG/1
10 TABLET ORAL DAILY
Qty: 30 TABLET | Refills: 2 | Status: SHIPPED | OUTPATIENT
Start: 2024-10-02 | End: 2024-12-31

## 2024-10-02 RX ORDER — COLCHICINE 0.6 MG/1
TABLET ORAL DAILY
COMMUNITY

## 2024-10-02 RX ORDER — CARBIDOPA AND LEVODOPA 50; 200 MG/1; MG/1
1 TABLET, EXTENDED RELEASE ORAL
COMMUNITY
Start: 2024-09-16 | End: 2024-10-02 | Stop reason: WASHOUT

## 2024-10-02 RX ORDER — DULOXETIN HYDROCHLORIDE 60 MG/1
60 CAPSULE, DELAYED RELEASE ORAL
COMMUNITY
Start: 2024-09-15 | End: 2024-10-02 | Stop reason: WASHOUT

## 2024-10-02 RX ORDER — SPIRONOLACTONE 25 MG/1
25 TABLET ORAL DAILY
Qty: 30 TABLET | Refills: 11 | Status: SHIPPED | OUTPATIENT
Start: 2024-10-02 | End: 2025-10-02

## 2024-10-02 RX ORDER — TADALAFIL 20 MG/1
1 TABLET ORAL
COMMUNITY
Start: 2024-06-19 | End: 2024-10-02 | Stop reason: WASHOUT

## 2024-10-02 NOTE — PROGRESS NOTES
[facility] Vascular Cardiology Clinic Note    Date: 10/02/24  Time: 6:00 PM    Subjective   Jhon Blair is a 73 year old male patient who presents today to clinic for continued care regarding elevated blood pressure.  Patient reports that for last 3 weeks he has been having elevated blood pressure.  He brought a log of his blood pressure reading with blood pressure as high as 180s.  Systolic and diastolic in the 110s patient reports he is compliant with medications and has been increasing his antihypertensive therapy.  Today his blood pressure is elevated in the clinic.  He is noncompliant with dietary restriction.  He has been having headaches intermittently especially at nighttime.  With elevated blood pressure he does not necessarily have headache.  He denies any chest pain or dyspnea.  He denies any blurry vision.    Denies any illicit drug use.    Review of Systems:  Otherwise, limited cardiovascular review of systems is negative.        Medical History:   He has a past medical history of COVID-19 and Personal history of other specified conditions.  Surgical History:   Past Surgical History:   Procedure Laterality Date    OTHER SURGICAL HISTORY  07/22/2021    Eye surgery   PS[facility]@  Social History:   Social Determinants of Health with Concerns     Alcohol Use: Not on file   Financial Resource Strain: Not on file   Food Insecurity: Not on file   Transportation Needs: Not on file   Physical Activity: Not on file   Stress: Not on file   Social Connections: Not on file   Intimate Partner Violence: Not on file   Depression: Not on file   Housing Stability: Not on file   Utilities: Not on file   Digital Equity: Not on file   Health Literacy: Not on file     Family History:   Family History   Problem Relation Name Age of Onset    Heart attack Father        Allergies:  Patient has no known allergies.    Outpatient Medications:  Current Outpatient Medications   Medication Instructions    acetaminophen (TYLENOL)  "1,000 mg, oral, Nightly    amLODIPine (NORVASC) 10 mg, oral, Daily    aspirin 81 mg EC tablet 1 tablet, oral, Daily    atorvastatin (Lipitor) 20 mg tablet 1 tablet, oral, Nightly    busPIRone (BUSPAR) 10 mg, oral, 2 times daily    citalopram (CELEXA) 20 mg, oral, Daily before breakfast    clonazePAM (KLONOPIN) 2 mg, oral, Daily    colchicine 0.6 mg tablet oral, Daily    indomethacin (Indocin) 50 mg capsule TAKE 1 CAPSULE BY MOUTH WITH FOOD OR MILK TWICE DAILY WITH GOUTY SPELL    lisinopril 40 mg tablet 1 tablet, oral, Daily    metoprolol succinate XL (TOPROL-XL) 50 mg, oral, Daily    QUEtiapine (SEROQUEL) 25 mg, oral    rOPINIRole (Requip) 0.5 mg tablet TAKE 1 TABLET BY MOUTH EVERY NIGHT 1 TO 3 HOURS BEFORE BEDTIME    spironolactone (ALDACTONE) 25 mg, oral, Daily    tamsulosin (FLOMAX) 0.4 mg, oral, Daily       Objective     Physical Exam  Vitals:    10/02/24 1442   BP: (!) 142/106   BP Location: Left arm   Patient Position: Sitting   BP Cuff Size: Adult   Pulse: 99   SpO2: 94%   Weight: 100 kg (221 lb)   Height: 1.778 m (5' 10\")     Wt Readings from Last 3 Encounters:   10/02/24 100 kg (221 lb)   04/15/24 104 kg (229 lb)   03/14/24 101 kg (223 lb)       General: Alert and Oriented, No distress, cooperative  Head: Normocephalic without obvious abnormality, atraumatic  Eyes: Conjunctiva/corneas clear, EOM's grossly intact  Neck: Supple, trachea midline, No thyroid enlargement/tenderness/nodules; No JVD  Lungs: Clear to auscultation bilaterally, no wheezes, rhonci, or rales. respirations unlabored  Chest Wall: No tenderness or deformity  Heart: Regular rhythm, normal S1/S2, no murmur  Abdomen: Soft, non-tender, Non-distended, bowel sounds active  Extremities: No edema, no cyanosis, no clubbing  Skin: Skin color, texture, turgor normal.  No rashes or lesions noted  Neurologic: Alert and oriented x 3, grossly moving all extremities, speech intact        I have personally reviewed the following images and laboratory " findings:  ECG: NSR, RBBB, left axis deviation  Echocardiogram:   PHYSICIAN INTERPRETATION:  Left Ventricle: The left ventricular systolic function is normal, with an estimated ejection fraction of 65%. There are no regional wall motion abnormalities. The left ventricular cavity size is normal. The left ventricular septal wall thickness is mildly increased. There is moderately increased left ventricular posterior wall thickness. Spectral Doppler shows a normal pattern of left ventricular diastolic filling.  Left Atrium: The left atrium is normal in size.  Right Ventricle: The right ventricle is normal in size. There is normal right ventricular global systolic function.  Right Atrium: The right atrium is normal in size.  Aortic Valve: The aortic valve is trileaflet. There is diffuse mild aortic valve thickening. There is no evidence of aortic valve stenosis.  There is trace to mild aortic valve regurgitation. The mean gradient of the aortic valve is 4.0 mmHg.  Mitral Valve: The mitral valve is mildly thickened. There is no evidence of mitral valve stenosis. There is trace mitral valve regurgitation.  Tricuspid Valve: The tricuspid valve is structurally normal. There is trace tricuspid regurgitation. The Doppler estimated RVSP is within normal limits at 26.6 mmHg.  Pulmonic Valve: The pulmonic valve is structurally normal. There is physiologic pulmonic valve regurgitation.  Pericardium: There is no pericardial effusion noted.  Aorta: The aortic root is normal.  Pulmonary Artery: The pulmonary artery is normal in size.  Pulmonary Veins: There is no evidence of pulmonary vein flow reversal.        CONCLUSIONS:   1. The left ventricular systolic function is normal with a 65% estimated ejection fraction.   2. The left ventricular posterior wall thickness is moderately increased.   3. RVSP within normal limits.   4. Aortic valve stenosis is not present.       Laboratory values:   No visits with results within 2 Month(s)  "from this visit.   Latest known visit with results is:   Ancillary Procedure on 03/14/2024   Component Date Value    BSA 03/14/2024 2.24      CBC -  Lab Results   Component Value Date    WBC 6.5 08/03/2023    HGB 13.0 (L) 08/03/2023    HCT 41.8 08/03/2023    MCV 84 08/03/2023     (L) 08/03/2023       CMP -  Lab Results   Component Value Date    CALCIUM 9.5 10/02/2024    PROT 6.4 08/03/2023    ALBUMIN 3.9 08/03/2023    AST 21 08/03/2023    ALT 16 08/03/2023    ALKPHOS 78 08/03/2023    BILITOT 0.8 08/03/2023       LIPID PANEL -   Lab Results   Component Value Date    CHOL 141 08/03/2023    HDL 40.2 08/03/2023    CHHDL 3.5 08/03/2023    VLDL 22 08/03/2023    TRIG 110 08/03/2023       RENAL FUNCTION PANEL -   Lab Results   Component Value Date    K 3.5 10/02/2024       No results found for: \"BNP\", \"HGBA1C\"     Assessment/Plan   Essential hypertension, uncontrolled  Hyperlipidemia  Tachycardia/SVT    Plan:  -I will continue lisinopril 40 mg daily as taking.  -I will increase amlodipine to 10 mg daily.  -I will start on Aldactone 25 mg daily.  -I will check BMP.  -I will check renal duplex to rule out renal artery stenosis.  -Continue metoprolol as taking.  -Continue statin therapy as taking.    In addition, the following orders were placed today:  Orders Placed This Encounter   Procedures    Basic metabolic panel    ECG 12 Lead                 SIGNATURE: Tae Lyles MD PATIENT NAME: Jhon Blair   DATE/TIME: October 2, 2024 6:00 PM MRN: [id number]                             "

## 2024-10-02 NOTE — TELEPHONE ENCOUNTER
10/2/24  1104  Patient called in to report he is continuing to have high blood pressure with a reading of today at 174/108 with accompanying headaches.     Patient reported he did not believe that going to the ED would be advantageous and reported, that if he had to, he would come up to the office and it would not be pretty and that he would sit int he waiting area all day until the offices staff got sick of seeing him.    Appt made with  and informed patient of such.

## 2024-10-03 ENCOUNTER — TELEPHONE (OUTPATIENT)
Dept: CARDIOLOGY | Facility: HOSPITAL | Age: 74
End: 2024-10-03
Payer: MEDICARE

## 2024-10-03 NOTE — TELEPHONE ENCOUNTER
10/3/24  1156  Called results to patient and told him okay to start aldactone; recommended he stagger his amlodipine and aldactone by 1-2 hours. When patient also informed he does take indocin for pain, nurse advised him to inform his PCP of this.      Patient verbalized understanding of conversation.      ----- Message from Tae Lyles sent at 10/2/2024  6:00 PM EDT -----  Labs okay, okay to continue Aldactone.  ----- Message -----  From: Lab, Background User  Sent: 10/2/2024   5:26 PM EDT  To: Tae Lyles MD

## 2024-10-09 ENCOUNTER — CLINICAL SUPPORT (OUTPATIENT)
Dept: CARDIOLOGY | Facility: HOSPITAL | Age: 74
End: 2024-10-09
Payer: MEDICARE

## 2024-10-09 ENCOUNTER — TELEPHONE (OUTPATIENT)
Dept: CARDIOLOGY | Facility: HOSPITAL | Age: 74
End: 2024-10-09
Payer: MEDICARE

## 2024-10-09 VITALS — SYSTOLIC BLOOD PRESSURE: 128 MMHG | DIASTOLIC BLOOD PRESSURE: 58 MMHG

## 2024-10-09 PROCEDURE — 99211 OFF/OP EST MAY X REQ PHY/QHP: CPT

## 2024-10-09 NOTE — TELEPHONE ENCOUNTER
Patient in office this am for blood pressure check.  Initially his blood pressure was 128/58.  He tells me that his pressure has gotten better at home.  He did not bring his home log or his machine.  Asking if he can go home and get his machine and come back.  He did and his blood pressure with his machine was 127/79 and manually it was 124/64.  Told him to keep his follow up appointments and to continue. monitor his pressure at home and bring his log to his appointment.

## 2024-10-15 ENCOUNTER — HOSPITAL ENCOUNTER (OUTPATIENT)
Dept: RADIOLOGY | Facility: HOSPITAL | Age: 74
Discharge: HOME | End: 2024-10-15
Payer: MEDICARE

## 2024-10-15 DIAGNOSIS — Z01.810 VENTRICULAR ECTOPY/ARRHYTHMIA, PRE-OPERATIVE CARDIOVASCULAR EXAM: ICD-10-CM

## 2024-10-15 DIAGNOSIS — I49.3 VENTRICULAR ECTOPY/ARRHYTHMIA, PRE-OPERATIVE CARDIOVASCULAR EXAM: ICD-10-CM

## 2024-10-15 DIAGNOSIS — I10 BENIGN ESSENTIAL HYPERTENSION: ICD-10-CM

## 2024-10-15 PROCEDURE — 75571 CT HRT W/O DYE W/CA TEST: CPT

## 2024-10-16 PROBLEM — F41.8 MIXED ANXIETY DEPRESSIVE DISORDER: Status: ACTIVE | Noted: 2023-09-20

## 2024-10-16 PROBLEM — R06.09 DYSPNEA ON EXERTION: Status: ACTIVE | Noted: 2023-09-20

## 2024-10-16 PROBLEM — G47.33 OBSTRUCTIVE SLEEP APNEA SYNDROME: Status: ACTIVE | Noted: 2023-08-22

## 2024-10-16 PROBLEM — L03.90 CELLULITIS: Status: ACTIVE | Noted: 2018-12-03

## 2024-10-16 PROBLEM — U07.1 DISEASE DUE TO SEVERE ACUTE RESPIRATORY SYNDROME CORONAVIRUS 2 (SARS-COV-2): Status: ACTIVE | Noted: 2024-10-16

## 2024-10-18 ENCOUNTER — HOSPITAL ENCOUNTER (OUTPATIENT)
Dept: VASCULAR MEDICINE | Facility: CLINIC | Age: 74
Discharge: HOME | End: 2024-10-18
Payer: MEDICARE

## 2024-10-18 DIAGNOSIS — I10 BENIGN ESSENTIAL HYPERTENSION: ICD-10-CM

## 2024-10-20 NOTE — PROGRESS NOTES
"Counseling:  The patient was counseled regarding diagnostic results, instructions for management, risk factor reductions, prognosis, patient and family education, impressions, risks and benefits of treatment options and importance of compliance with treatment.      Chief Complaint:   The patient presents today for 6-month followup of HTN, ventricular arrhythmia and elevated CT calcium score s/p renal artery U/S and repeat CT calcium score.     History Of Present Illness:    Jhon Blair is a 74 year old male patient who presents today for 6-month followup of HTN, ventricular arrhythmia and elevated CT calcium score s/p renal artery U/S and repeat CT calcium score. His PMH is significant for anxiety/depression, hyperlipidemia, HTN, PHIL and h/o COVID infection. The patient was seen by one of my partner's, Dr. ELDER Lyles, on 10/02/2024 for evaluation of hypertensive BP readings. At that time, the patient's amlodipine was increased to 10 mg daily, he was prescribed spironolactone 25 mg daily and a renal artery U/S was ordered. CT calcium score was performed 10/15/2024 but has not yet been resulted. Renal artery U/S performed 10/18/2024 was negative for stenosis bilaterally. Today, the patient states that he is feeling well. He denies any CP or chest discomfort. He reports exertional SOB. He unfortunately was unable to tolerate the spironolactone as he developed diaphoresis and lightheadedness within 1 hour of taking it. BP has improved with the increase in his amlodipine dose; however, since the increase his HR has increased.     Last Recorded Vitals:  Vitals:    10/21/24 1128   BP: 110/68   Pulse: (!) 114   Weight: 98.9 kg (218 lb)   Height: 1.778 m (5' 10\")       Past Surgical History:  He has a past surgical history that includes Other surgical history (07/22/2021).      Social History:  He reports that he has never smoked. He has never used smokeless tobacco. He reports that he does not currently use alcohol. He reports " that he does not use drugs.    Family History:  Family History   Problem Relation Name Age of Onset    Heart attack Father          Allergies:  Patient has no known allergies.    Outpatient Medications:  Current Outpatient Medications   Medication Instructions    acetaminophen (TYLENOL) 1,000 mg, Nightly    amLODIPine (NORVASC) 10 mg, oral, Daily    aspirin 81 mg EC tablet 1 tablet, Daily    atorvastatin (Lipitor) 20 mg tablet 1 tablet, Nightly    busPIRone (BUSPAR) 10 mg, 2 times daily    citalopram (CELEXA) 20 mg, Daily before breakfast    clonazePAM (KLONOPIN) 2 mg, Daily    colchicine 0.6 mg tablet Daily    indomethacin (Indocin) 50 mg capsule TAKE 1 CAPSULE BY MOUTH WITH FOOD OR MILK TWICE DAILY WITH GOUTY SPELL    lisinopril 40 mg tablet 1 tablet, Daily    metoprolol succinate XL (TOPROL-XL) 50 mg, oral, Daily    QUEtiapine (SEROQUEL) 25 mg    rOPINIRole (Requip) 0.5 mg tablet TAKE 1 TABLET BY MOUTH EVERY NIGHT 1 TO 3 HOURS BEFORE BEDTIME    spironolactone (ALDACTONE) 25 mg, oral, Daily    tamsulosin (FLOMAX) 0.4 mg, Daily     Review of Systems   Cardiovascular:  Positive for dyspnea on exertion.        Tachycardia   All other systems reviewed and are negative.     Physical Exam:  Constitutional:       Appearance: Healthy appearance. Not in distress.   Neck:      Vascular: No JVR. JVD normal.   Pulmonary:      Effort: Pulmonary effort is normal.      Breath sounds: Normal breath sounds. No wheezing. No rhonchi. No rales.   Chest:      Chest wall: Not tender to palpatation.   Cardiovascular:      PMI at left midclavicular line. Normal rate. Regular rhythm. Normal S1. Normal S2.       Murmurs: There is no murmur.      No gallop.  No click. No rub.   Pulses:     Intact distal pulses.   Edema:     Peripheral edema absent.   Abdominal:      General: Bowel sounds are normal.      Palpations: Abdomen is soft.      Tenderness: There is no abdominal tenderness.   Musculoskeletal: Normal range of motion.          General: No tenderness. Skin:     General: Skin is warm and dry.   Neurological:      General: No focal deficit present.      Mental Status: Alert and oriented to person, place and time.          Last Labs:  CBC -  Lab Results   Component Value Date    WBC 6.5 08/03/2023    HGB 13.0 (L) 08/03/2023    HCT 41.8 08/03/2023    MCV 84 08/03/2023     (L) 08/03/2023       CMP -  Lab Results   Component Value Date    CALCIUM 9.5 10/02/2024    PROT 6.4 08/03/2023    ALBUMIN 3.9 08/03/2023    AST 21 08/03/2023    ALT 16 08/03/2023    ALKPHOS 78 08/03/2023    BILITOT 0.8 08/03/2023       LIPID PANEL -   Lab Results   Component Value Date    CHOL 141 08/03/2023    TRIG 110 08/03/2023    HDL 40.2 08/03/2023    CHHDL 3.5 08/03/2023    LDLF 79 08/03/2023    VLDL 22 08/03/2023       RENAL FUNCTION PANEL -   Lab Results   Component Value Date    GLUCOSE 100 (H) 10/02/2024     10/02/2024    K 3.5 10/02/2024     10/02/2024    CO2 27 10/02/2024    ANIONGAP 12 10/02/2024    BUN 14 10/02/2024    CREATININE 0.88 10/02/2024    GFRMALE 74 08/03/2023    CALCIUM 9.5 10/02/2024    ALBUMIN 3.9 08/03/2023      Last Cardiology Tests:  03/14/2024 to 03/21/2024 - Holter Monitor  1. Predominant underlying rhythm was sinus rhythm; min HR 41 bpm, max  bpm, avg HR 56 bpm.  2. 30 supraventricular tachycardia runs occurred; fastest interval lasting 9 beats with max rate 164 bpm, longest lasting 12.1 seconds with avg rate 119 bpm.  3. Isolated SVEs were frequent (13.2%), SVE couplets were rare, and SVE triplets were rare.  4. Isolated VEs were occasional (3.0%), and no VE couplets or VE triplets were present.    07/10/2020 - NM Cardiac Stress Test  1. Probably normal myocardial perfusion scan with no ischemia seen. Some diaphragm attenuation artifact along the inferior wall is noted. There is well-preserved LV function.  2. EKG Portion: No clinical or electrocardiographic evidence for ischemia at maximal infusion. Normal Stress  Test.     07/02/2020 - CT Cardiac Scoring  1. Coronary artery calcium score of 23.  2. Few small right-sided pulmonary nodules measuring up to 5 mm, likely benign. No further follow-up is required, however, if the patient has high risk factors for primary lung malignancy, follow-up noncontrast CT scan chest in 12 months may be obtained.      06/23/2020 - TTE  1. The left ventricular systolic function is normal with a 65% estimated ejection fraction.  2. The left ventricular posterior wall thickness is moderately increased.  3. RVSP within normal limits.  4. Aortic valve stenosis is not present.     04/02/20- CT Chest Abd Pelv- Patchy groundglass infiltrates. This is nonspecific but could be indicative of a viral pneumonia. 4mm right middle lobe lung nodule. This is too small to further characterize. In a low risk patient. Enlarged pulmonary arteries consistent with chronic pulmonary hypertension. Residual contrast in the colon. Cholelithiasis. The gallbladder wall remains normal. Bilateral renal cysts.     Lab review: I have personally reviewed the laboratory result(s).   Diagnostic review: I have personally reviewed the result(s) of the Renal Artery U/S.    Assessment/Plan   1) HTN  On lisinopril 40 mg daily, metoprolol succinate 50 mg once daily, amlodipine 10 mg daily in the evening, spironolactone 25 mg daily.  Metoprolol previously decreased from BID to once daily s/t bradycardia   Seen by Dr. ELDER Lyles, 10/02/2024 for evaluation of hypertensive BP readings - amlodipine increased to 10 mg daily, prescribed spironolactone 25 mg daily, renal artery U/S ordered.  Renal artery U/S 10/18/2024 negative for stenosis bilaterally    Patient unable to tolerate spironolactone s/t sudden onset diaphoresis and lightheadedness  BP improved with increase in amlodipine dose; however, HR has since elevated   Remain off spironolactone  Decrease amlodipine to 5 mg daily   Increase metoprolol to 100 mg daily  Continue all other  medications as prescribed   Home monitoring of BP - to contact our office if BP becomes elevated above 140/90  Check TSH  F/U 2 weeks     2) Exercise Intolerance s/p COVID Infection with Tachycardia  Echo with no structural heart disease     3) Risk Factors for CAD - Ventricular Arrhythmia  CT Calcium Score was 23 in July 2020, but stress test negative for ischemia July 2020  On ASA 81 mg daily, atorvastatin 20 mg daily, lisinopril 40 mg daily, metoprolol succinate 50 mg daily, amlodipine 10 mg daily in the evening.  On Requip for restless legs   Recommend discussing with PCP about switching from carbidopa-levodopa to Requip   Metoprolol previously decreased from BID to once daily s/t bradycardia   Holter with SVT, SVE burden of 13.2% and VE burden of 3%  Stable  Increase metoprolol to 100 mg daily s/t elevated HR   Decrease amlodipine to 5 mg daily s/t elevated HR since increase in dose  Continue all other medications as prescribed  Check TSH  F/U 2 weeks    4) Elevated CT Calcium Score  CT Calcium Score was 23 in July 2020, but stress test negative for ischemia July 2020  Lipid panel 08/03/2023 with total cholesterol, LDL and triglycerides of 141, 79 and 110 respectively  CT calcium score 10/15/2024 - not yet resulted   Check echo as ordered in 04/2024  F/U 2 weeks      Scribe Attestation  By signing my name below, I, Tyshawn Mckee   attest that this documentation has been prepared under the direction and in the presence of Rylan Razo MD.

## 2024-10-21 ENCOUNTER — APPOINTMENT (OUTPATIENT)
Dept: VASCULAR MEDICINE | Facility: CLINIC | Age: 74
End: 2024-10-21
Payer: MEDICARE

## 2024-10-21 ENCOUNTER — APPOINTMENT (OUTPATIENT)
Dept: CARDIOLOGY | Facility: CLINIC | Age: 74
End: 2024-10-21
Payer: MEDICARE

## 2024-10-21 ENCOUNTER — TELEPHONE (OUTPATIENT)
Dept: CARDIOLOGY | Facility: HOSPITAL | Age: 74
End: 2024-10-21

## 2024-10-21 ENCOUNTER — HOSPITAL ENCOUNTER (OUTPATIENT)
Dept: VASCULAR MEDICINE | Facility: CLINIC | Age: 74
Discharge: HOME | End: 2024-10-21
Payer: MEDICARE

## 2024-10-21 VITALS
BODY MASS INDEX: 31.21 KG/M2 | DIASTOLIC BLOOD PRESSURE: 68 MMHG | HEIGHT: 70 IN | HEART RATE: 114 BPM | WEIGHT: 218 LBS | SYSTOLIC BLOOD PRESSURE: 110 MMHG

## 2024-10-21 DIAGNOSIS — R00.0 TACHYCARDIA: ICD-10-CM

## 2024-10-21 DIAGNOSIS — I10 BENIGN ESSENTIAL HYPERTENSION: ICD-10-CM

## 2024-10-21 DIAGNOSIS — I50.33 HEART FAILURE, DIASTOLIC, ACUTE ON CHRONIC: ICD-10-CM

## 2024-10-21 PROCEDURE — 99213 OFFICE O/P EST LOW 20 MIN: CPT | Performed by: INTERNAL MEDICINE

## 2024-10-21 PROCEDURE — 3078F DIAST BP <80 MM HG: CPT | Performed by: INTERNAL MEDICINE

## 2024-10-21 PROCEDURE — 1159F MED LIST DOCD IN RCRD: CPT | Performed by: INTERNAL MEDICINE

## 2024-10-21 PROCEDURE — 1160F RVW MEDS BY RX/DR IN RCRD: CPT | Performed by: INTERNAL MEDICINE

## 2024-10-21 PROCEDURE — 3074F SYST BP LT 130 MM HG: CPT | Performed by: INTERNAL MEDICINE

## 2024-10-21 PROCEDURE — G2211 COMPLEX E/M VISIT ADD ON: HCPCS | Performed by: INTERNAL MEDICINE

## 2024-10-21 PROCEDURE — 93975 VASCULAR STUDY: CPT

## 2024-10-21 PROCEDURE — 93975 VASCULAR STUDY: CPT | Performed by: SURGERY

## 2024-10-21 PROCEDURE — 1036F TOBACCO NON-USER: CPT | Performed by: INTERNAL MEDICINE

## 2024-10-21 RX ORDER — AMLODIPINE BESYLATE 10 MG/1
5 TABLET ORAL DAILY
Qty: 45 TABLET | Refills: 3 | Status: SHIPPED | OUTPATIENT
Start: 2024-10-21 | End: 2025-10-21

## 2024-10-21 RX ORDER — METOPROLOL SUCCINATE 100 MG/1
100 TABLET, EXTENDED RELEASE ORAL DAILY
Qty: 90 TABLET | Refills: 3 | Status: SHIPPED | OUTPATIENT
Start: 2024-10-21 | End: 2025-10-21

## 2024-10-21 ASSESSMENT — ENCOUNTER SYMPTOMS: DYSPNEA ON EXERTION: 1

## 2024-10-21 NOTE — PATIENT INSTRUCTIONS
Remain off the spironolactone.  Decrease amlodipine to 5 mg daily. If you have any of the 10 mg tablets left, you can cut these in half and take one half tablet once daily until they are finished. A prescription for the new dose has been sent to your pharmacy.  Increase metoprolol to 100 mg daily. If you have any of the 50 mg tablets left, you can take 2 tablets once daily until they are finished. A prescription for the new dose has been sent to your pharmacy.  Continue all other medications as prescribed.    Continue to monitor your blood pressure at home. Please contact our office if your blood pressure elevates above 140/90.  Please have blood work drawn to check on your thyroid function.   Dr. Razo has ordered an echocardiogram (ultrasound of the heart) to followup on your heart function and structure.   Followup with Dr. Razo in 2 weeks.    If you have any questions or cardiac concerns, please call our office at [phone].

## 2024-10-21 NOTE — LETTER
October 21, 2024     Clayton W Seiple, DO  [address] [facility] [address]    Patient: Jhon Blair   YOB: 1950   Date of Visit: 10/21/2024       Dear Dr. Clayton W Seiple, DO:    Thank you for referring Jhon Blair to me for evaluation. Below are my notes for this consultation.  If you have questions, please do not hesitate to call me. I look forward to following your patient along with you.       Sincerely,     Rylan Razo MD      CC: No Recipients  ______________________________________________________________________________________    Counseling:  The patient was counseled regarding diagnostic results, instructions for management, risk factor reductions, prognosis, patient and family education, impressions, risks and benefits of treatment options and importance of compliance with treatment.      Chief Complaint:   The patient presents today for 6-month followup of HTN, ventricular arrhythmia and elevated CT calcium score s/p renal artery U/S and repeat CT calcium score.     History Of Present Illness:    Jhon Blair is a 74 year old male patient who presents today for 6-month followup of HTN, ventricular arrhythmia and elevated CT calcium score s/p renal artery U/S and repeat CT calcium score. His PMH is significant for anxiety/depression, hyperlipidemia, HTN, PHIL and h/o COVID infection. The patient was seen by one of my partner's, Dr. ELDER Lyles, on 10/02/2024 for evaluation of hypertensive BP readings. At that time, the patient's amlodipine was increased to 10 mg daily, he was prescribed spironolactone 25 mg daily and a renal artery U/S was ordered. CT calcium score was performed 10/15/2024 but has not yet been resulted. Renal artery U/S performed 10/18/2024 was negative for stenosis bilaterally. Today, the patient states that he is feeling well. He denies any CP or chest discomfort. He reports exertional SOB. He unfortunately was unable to tolerate the spironolactone as he developed diaphoresis  "and lightheadedness within 1 hour of taking it. BP has improved with the increase in his amlodipine dose; however, since the increase his HR has increased.     Last Recorded Vitals:  Vitals:    10/21/24 1128   BP: 110/68   Pulse: (!) 114   Weight: 98.9 kg (218 lb)   Height: 1.778 m (5' 10\")       Past Surgical History:  He has a past surgical history that includes Other surgical history (07/22/2021).      Social History:  He reports that he has never smoked. He has never used smokeless tobacco. He reports that he does not currently use alcohol. He reports that he does not use drugs.    Family History:  Family History   Problem Relation Name Age of Onset   • Heart attack Father          Allergies:  Patient has no known allergies.    Outpatient Medications:  Current Outpatient Medications   Medication Instructions   • acetaminophen (TYLENOL) 1,000 mg, Nightly   • amLODIPine (NORVASC) 10 mg, oral, Daily   • aspirin 81 mg EC tablet 1 tablet, Daily   • atorvastatin (Lipitor) 20 mg tablet 1 tablet, Nightly   • busPIRone (BUSPAR) 10 mg, 2 times daily   • citalopram (CELEXA) 20 mg, Daily before breakfast   • clonazePAM (KLONOPIN) 2 mg, Daily   • colchicine 0.6 mg tablet Daily   • indomethacin (Indocin) 50 mg capsule TAKE 1 CAPSULE BY MOUTH WITH FOOD OR MILK TWICE DAILY WITH GOUTY SPELL   • lisinopril 40 mg tablet 1 tablet, Daily   • metoprolol succinate XL (TOPROL-XL) 50 mg, oral, Daily   • QUEtiapine (SEROQUEL) 25 mg   • rOPINIRole (Requip) 0.5 mg tablet TAKE 1 TABLET BY MOUTH EVERY NIGHT 1 TO 3 HOURS BEFORE BEDTIME   • spironolactone (ALDACTONE) 25 mg, oral, Daily   • tamsulosin (FLOMAX) 0.4 mg, Daily     Review of Systems   Cardiovascular:  Positive for dyspnea on exertion.        Tachycardia   All other systems reviewed and are negative.     Physical Exam:  Constitutional:       Appearance: Healthy appearance. Not in distress.   Neck:      Vascular: No JVR. JVD normal.   Pulmonary:      Effort: Pulmonary effort is " normal.      Breath sounds: Normal breath sounds. No wheezing. No rhonchi. No rales.   Chest:      Chest wall: Not tender to palpatation.   Cardiovascular:      PMI at left midclavicular line. Normal rate. Regular rhythm. Normal S1. Normal S2.       Murmurs: There is no murmur.      No gallop.  No click. No rub.   Pulses:     Intact distal pulses.   Edema:     Peripheral edema absent.   Abdominal:      General: Bowel sounds are normal.      Palpations: Abdomen is soft.      Tenderness: There is no abdominal tenderness.   Musculoskeletal: Normal range of motion.         General: No tenderness. Skin:     General: Skin is warm and dry.   Neurological:      General: No focal deficit present.      Mental Status: Alert and oriented to person, place and time.          Last Labs:  CBC -  Lab Results   Component Value Date    WBC 6.5 08/03/2023    HGB 13.0 (L) 08/03/2023    HCT 41.8 08/03/2023    MCV 84 08/03/2023     (L) 08/03/2023       CMP -  Lab Results   Component Value Date    CALCIUM 9.5 10/02/2024    PROT 6.4 08/03/2023    ALBUMIN 3.9 08/03/2023    AST 21 08/03/2023    ALT 16 08/03/2023    ALKPHOS 78 08/03/2023    BILITOT 0.8 08/03/2023       LIPID PANEL -   Lab Results   Component Value Date    CHOL 141 08/03/2023    TRIG 110 08/03/2023    HDL 40.2 08/03/2023    CHHDL 3.5 08/03/2023    LDLF 79 08/03/2023    VLDL 22 08/03/2023       RENAL FUNCTION PANEL -   Lab Results   Component Value Date    GLUCOSE 100 (H) 10/02/2024     10/02/2024    K 3.5 10/02/2024     10/02/2024    CO2 27 10/02/2024    ANIONGAP 12 10/02/2024    BUN 14 10/02/2024    CREATININE 0.88 10/02/2024    GFRMALE 74 08/03/2023    CALCIUM 9.5 10/02/2024    ALBUMIN 3.9 08/03/2023      Last Cardiology Tests:  03/14/2024 to 03/21/2024 - Holter Monitor  1. Predominant underlying rhythm was sinus rhythm; min HR 41 bpm, max  bpm, avg HR 56 bpm.  2. 30 supraventricular tachycardia runs occurred; fastest interval lasting 9 beats with max  rate 164 bpm, longest lasting 12.1 seconds with avg rate 119 bpm.  3. Isolated SVEs were frequent (13.2%), SVE couplets were rare, and SVE triplets were rare.  4. Isolated VEs were occasional (3.0%), and no VE couplets or VE triplets were present.    07/10/2020 - NM Cardiac Stress Test  1. Probably normal myocardial perfusion scan with no ischemia seen. Some diaphragm attenuation artifact along the inferior wall is noted. There is well-preserved LV function.  2. EKG Portion: No clinical or electrocardiographic evidence for ischemia at maximal infusion. Normal Stress Test.     07/02/2020 - CT Cardiac Scoring  1. Coronary artery calcium score of 23.  2. Few small right-sided pulmonary nodules measuring up to 5 mm, likely benign. No further follow-up is required, however, if the patient has high risk factors for primary lung malignancy, follow-up noncontrast CT scan chest in 12 months may be obtained.      06/23/2020 - TTE  1. The left ventricular systolic function is normal with a 65% estimated ejection fraction.  2. The left ventricular posterior wall thickness is moderately increased.  3. RVSP within normal limits.  4. Aortic valve stenosis is not present.     04/02/20- CT Chest Abd Pelv- Patchy groundglass infiltrates. This is nonspecific but could be indicative of a viral pneumonia. 4mm right middle lobe lung nodule. This is too small to further characterize. In a low risk patient. Enlarged pulmonary arteries consistent with chronic pulmonary hypertension. Residual contrast in the colon. Cholelithiasis. The gallbladder wall remains normal. Bilateral renal cysts.     Lab review: I have personally reviewed the laboratory result(s).   Diagnostic review: I have personally reviewed the result(s) of the Renal Artery U/S.    Assessment/Plan  1) HTN  On lisinopril 40 mg daily, metoprolol succinate 50 mg once daily, amlodipine 10 mg daily in the evening, spironolactone 25 mg daily.  Metoprolol previously decreased from BID  to once daily s/t bradycardia   Seen by Dr. ELDER Lyles, 10/02/2024 for evaluation of hypertensive BP readings - amlodipine increased to 10 mg daily, prescribed spironolactone 25 mg daily, renal artery U/S ordered.  Renal artery U/S 10/18/2024 negative for stenosis bilaterally    Patient unable to tolerate spironolactone s/t sudden onset diaphoresis and lightheadedness  BP improved with increase in amlodipine dose; however, HR has since elevated   Remain off spironolactone  Decrease amlodipine to 5 mg daily   Increase metoprolol to 100 mg daily  Continue all other medications as prescribed   Home monitoring of BP - to contact our office if BP becomes elevated above 140/90  Check TSH  F/U 2 weeks     2) Exercise Intolerance s/p COVID Infection with Tachycardia  Echo with no structural heart disease     3) Risk Factors for CAD - Ventricular Arrhythmia  CT Calcium Score was 23 in July 2020, but stress test negative for ischemia July 2020  On ASA 81 mg daily, atorvastatin 20 mg daily, lisinopril 40 mg daily, metoprolol succinate 50 mg daily, amlodipine 10 mg daily in the evening.  On Requip for restless legs   Recommend discussing with PCP about switching from carbidopa-levodopa to Requip   Metoprolol previously decreased from BID to once daily s/t bradycardia   Holter with SVT, SVE burden of 13.2% and VE burden of 3%  Stable  Increase metoprolol to 100 mg daily s/t elevated HR   Decrease amlodipine to 5 mg daily s/t elevated HR since increase in dose  Continue all other medications as prescribed  Check TSH  F/U 2 weeks    4) Elevated CT Calcium Score  CT Calcium Score was 23 in July 2020, but stress test negative for ischemia July 2020  Lipid panel 08/03/2023 with total cholesterol, LDL and triglycerides of 141, 79 and 110 respectively  CT calcium score 10/15/2024 - not yet resulted   Check echo as ordered in 04/2024  F/U 2 weeks      Scribe Attestation  By signing my name below, I, Tyshawn Mckee   attest that  this documentation has been prepared under the direction and in the presence of Rylan Razo MD.

## 2024-10-21 NOTE — TELEPHONE ENCOUNTER
10/21/24  [id number]  Called results and follow up directive to patient with patient verbalizing understanding.    ----- Message from Tae Lyles sent at 10/21/2024  4:47 PM EDT -----  No significant stenosis, routine f/up  ----- Message -----  From: Sudheer, Syngo - Cardiology Results In  Sent: 10/21/2024   3:55 PM EDT  To: Tae Lyles MD

## 2024-10-22 ENCOUNTER — LAB (OUTPATIENT)
Dept: LAB | Facility: LAB | Age: 74
End: 2024-10-22
Payer: MEDICARE

## 2024-10-22 DIAGNOSIS — I10 BENIGN ESSENTIAL HYPERTENSION: ICD-10-CM

## 2024-10-22 DIAGNOSIS — R00.0 TACHYCARDIA: ICD-10-CM

## 2024-10-22 LAB — TSH SERPL-ACNC: 0.54 MIU/L (ref 0.44–3.98)

## 2024-10-22 PROCEDURE — 36415 COLL VENOUS BLD VENIPUNCTURE: CPT

## 2024-10-22 PROCEDURE — 84443 ASSAY THYROID STIM HORMONE: CPT

## 2024-10-23 ENCOUNTER — HOSPITAL ENCOUNTER (OUTPATIENT)
Dept: CARDIOLOGY | Facility: HOSPITAL | Age: 74
Discharge: HOME | End: 2024-10-23
Payer: MEDICARE

## 2024-10-23 DIAGNOSIS — R00.0 TACHYCARDIA: ICD-10-CM

## 2024-10-23 DIAGNOSIS — I10 BENIGN ESSENTIAL HYPERTENSION: ICD-10-CM

## 2024-10-23 DIAGNOSIS — I50.33 HEART FAILURE, DIASTOLIC, ACUTE ON CHRONIC: ICD-10-CM

## 2024-10-23 LAB
AORTIC VALVE MEAN GRADIENT: 4.9 MMHG
AORTIC VALVE PEAK VELOCITY: 1.55 M/S
AV PEAK GRADIENT: 9.6 MMHG
AVA (PEAK VEL): 3.88 CM2
AVA (VTI): 4.11 CM2
EJECTION FRACTION APICAL 4 CHAMBER: 51.5
EJECTION FRACTION: 58 %
LEFT ATRIUM VOLUME AREA LENGTH INDEX BSA: 20.3 ML/M2
LEFT VENTRICLE INTERNAL DIMENSION DIASTOLE: 4.19 CM (ref 3.5–6)
LEFT VENTRICULAR OUTFLOW TRACT DIAMETER: 2.48 CM
LV EJECTION FRACTION BIPLANE: 51 %
MITRAL VALVE E/A RATIO: 0.81
MITRAL VALVE E/E' RATIO: 8.26
RIGHT VENTRICLE FREE WALL PEAK S': 11.08 CM/S
RIGHT VENTRICLE PEAK SYSTOLIC PRESSURE: 32.8 MMHG
TRICUSPID ANNULAR PLANE SYSTOLIC EXCURSION: 2.1 CM

## 2024-10-23 PROCEDURE — 93306 TTE W/DOPPLER COMPLETE: CPT | Performed by: INTERNAL MEDICINE

## 2024-10-23 PROCEDURE — 93306 TTE W/DOPPLER COMPLETE: CPT

## 2024-10-28 ENCOUNTER — ANCILLARY PROCEDURE (OUTPATIENT)
Dept: URGENT CARE | Age: 74
End: 2024-10-28
Payer: MEDICARE

## 2024-10-28 ENCOUNTER — OFFICE VISIT (OUTPATIENT)
Dept: URGENT CARE | Age: 74
End: 2024-10-28
Payer: MEDICARE

## 2024-10-28 VITALS
DIASTOLIC BLOOD PRESSURE: 80 MMHG | TEMPERATURE: 97.7 F | OXYGEN SATURATION: 96 % | HEART RATE: 63 BPM | SYSTOLIC BLOOD PRESSURE: 125 MMHG

## 2024-10-28 DIAGNOSIS — S89.91XA INJURY OF RIGHT LOWER EXTREMITY, INITIAL ENCOUNTER: Primary | ICD-10-CM

## 2024-10-28 DIAGNOSIS — M25.551 RIGHT HIP PAIN: ICD-10-CM

## 2024-10-28 PROCEDURE — 3079F DIAST BP 80-89 MM HG: CPT | Performed by: PHYSICIAN ASSISTANT

## 2024-10-28 PROCEDURE — 99204 OFFICE O/P NEW MOD 45 MIN: CPT | Performed by: PHYSICIAN ASSISTANT

## 2024-10-28 PROCEDURE — 73502 X-RAY EXAM HIP UNI 2-3 VIEWS: CPT | Mod: RIGHT SIDE | Performed by: PHYSICIAN ASSISTANT

## 2024-10-28 PROCEDURE — 3074F SYST BP LT 130 MM HG: CPT | Performed by: PHYSICIAN ASSISTANT

## 2024-10-28 RX ORDER — MELOXICAM 7.5 MG/1
7.5 TABLET ORAL DAILY
Qty: 30 TABLET | Refills: 0 | Status: SHIPPED | OUTPATIENT
Start: 2024-10-28 | End: 2024-11-27

## 2024-10-28 ASSESSMENT — ENCOUNTER SYMPTOMS: LEG PAIN: 1

## 2024-10-29 RX ORDER — DULOXETIN HYDROCHLORIDE 60 MG/1
1 CAPSULE, DELAYED RELEASE ORAL
COMMUNITY
Start: 2024-10-25

## 2024-11-01 ENCOUNTER — HOSPITAL ENCOUNTER (EMERGENCY)
Facility: HOSPITAL | Age: 74
Discharge: HOME | End: 2024-11-01
Attending: STUDENT IN AN ORGANIZED HEALTH CARE EDUCATION/TRAINING PROGRAM
Payer: MEDICARE

## 2024-11-01 ENCOUNTER — APPOINTMENT (OUTPATIENT)
Dept: RADIOLOGY | Facility: HOSPITAL | Age: 74
End: 2024-11-01
Payer: MEDICARE

## 2024-11-01 VITALS
TEMPERATURE: 98.4 F | HEART RATE: 60 BPM | HEIGHT: 70 IN | OXYGEN SATURATION: 99 % | BODY MASS INDEX: 31.21 KG/M2 | DIASTOLIC BLOOD PRESSURE: 82 MMHG | RESPIRATION RATE: 18 BRPM | SYSTOLIC BLOOD PRESSURE: 128 MMHG | WEIGHT: 218 LBS

## 2024-11-01 DIAGNOSIS — M84.30XA STRESS REACTION OF BONE: ICD-10-CM

## 2024-11-01 DIAGNOSIS — M79.651 ACUTE PAIN OF RIGHT THIGH: Primary | ICD-10-CM

## 2024-11-01 PROCEDURE — 73700 CT LOWER EXTREMITY W/O DYE: CPT | Mod: RT

## 2024-11-01 PROCEDURE — 99284 EMERGENCY DEPT VISIT MOD MDM: CPT | Mod: 25

## 2024-11-01 PROCEDURE — 2500000001 HC RX 250 WO HCPCS SELF ADMINISTERED DRUGS (ALT 637 FOR MEDICARE OP): Performed by: NURSE PRACTITIONER

## 2024-11-01 PROCEDURE — 73700 CT LOWER EXTREMITY W/O DYE: CPT | Mod: RIGHT SIDE | Performed by: RADIOLOGY

## 2024-11-01 PROCEDURE — 96372 THER/PROPH/DIAG INJ SC/IM: CPT | Performed by: NURSE PRACTITIONER

## 2024-11-01 PROCEDURE — 2500000004 HC RX 250 GENERAL PHARMACY W/ HCPCS (ALT 636 FOR OP/ED): Performed by: NURSE PRACTITIONER

## 2024-11-01 PROCEDURE — 2500000001 HC RX 250 WO HCPCS SELF ADMINISTERED DRUGS (ALT 637 FOR MEDICARE OP): Performed by: STUDENT IN AN ORGANIZED HEALTH CARE EDUCATION/TRAINING PROGRAM

## 2024-11-01 RX ORDER — METHOCARBAMOL 500 MG/1
500 TABLET, FILM COATED ORAL ONCE
Status: COMPLETED | OUTPATIENT
Start: 2024-11-01 | End: 2024-11-01

## 2024-11-01 RX ORDER — KETOROLAC TROMETHAMINE 30 MG/ML
30 INJECTION, SOLUTION INTRAMUSCULAR; INTRAVENOUS ONCE
Status: COMPLETED | OUTPATIENT
Start: 2024-11-01 | End: 2024-11-01

## 2024-11-01 RX ORDER — OXYCODONE AND ACETAMINOPHEN 5; 325 MG/1; MG/1
1 TABLET ORAL EVERY 6 HOURS PRN
Qty: 12 TABLET | Refills: 0 | Status: SHIPPED | OUTPATIENT
Start: 2024-11-01 | End: 2024-11-04

## 2024-11-01 RX ORDER — OXYCODONE AND ACETAMINOPHEN 5; 325 MG/1; MG/1
1 TABLET ORAL ONCE
Status: COMPLETED | OUTPATIENT
Start: 2024-11-01 | End: 2024-11-01

## 2024-11-01 ASSESSMENT — COLUMBIA-SUICIDE SEVERITY RATING SCALE - C-SSRS
6. HAVE YOU EVER DONE ANYTHING, STARTED TO DO ANYTHING, OR PREPARED TO DO ANYTHING TO END YOUR LIFE?: NO
2. HAVE YOU ACTUALLY HAD ANY THOUGHTS OF KILLING YOURSELF?: NO
1. IN THE PAST MONTH, HAVE YOU WISHED YOU WERE DEAD OR WISHED YOU COULD GO TO SLEEP AND NOT WAKE UP?: NO

## 2024-11-01 ASSESSMENT — LIFESTYLE VARIABLES
TOTAL SCORE: 0
HAVE YOU EVER FELT YOU SHOULD CUT DOWN ON YOUR DRINKING: NO
EVER FELT BAD OR GUILTY ABOUT YOUR DRINKING: NO
EVER HAD A DRINK FIRST THING IN THE MORNING TO STEADY YOUR NERVES TO GET RID OF A HANGOVER: NO
HAVE PEOPLE ANNOYED YOU BY CRITICIZING YOUR DRINKING: NO

## 2024-11-01 ASSESSMENT — PAIN SCALES - GENERAL
PAINLEVEL_OUTOF10: 7
PAINLEVEL_OUTOF10: 7
PAINLEVEL_OUTOF10: 8

## 2024-11-01 ASSESSMENT — PAIN - FUNCTIONAL ASSESSMENT
PAIN_FUNCTIONAL_ASSESSMENT: 0-10
PAIN_FUNCTIONAL_ASSESSMENT: 0-10

## 2024-11-01 ASSESSMENT — PAIN DESCRIPTION - DESCRIPTORS
DESCRIPTORS: SHARP
DESCRIPTORS: SHARP

## 2024-11-01 ASSESSMENT — PAIN DESCRIPTION - LOCATION: LOCATION: LEG

## 2024-11-01 ASSESSMENT — PAIN DESCRIPTION - PAIN TYPE: TYPE: ACUTE PAIN

## 2024-11-01 ASSESSMENT — PAIN DESCRIPTION - ORIENTATION: ORIENTATION: RIGHT

## 2024-11-01 ASSESSMENT — PAIN DESCRIPTION - FREQUENCY: FREQUENCY: INTERMITTENT

## 2024-11-05 ENCOUNTER — OFFICE VISIT (OUTPATIENT)
Dept: CARDIOLOGY | Facility: HOSPITAL | Age: 74
End: 2024-11-05
Payer: MEDICARE

## 2024-11-05 VITALS
SYSTOLIC BLOOD PRESSURE: 116 MMHG | HEIGHT: 70 IN | BODY MASS INDEX: 31.21 KG/M2 | HEART RATE: 66 BPM | WEIGHT: 218 LBS | OXYGEN SATURATION: 95 % | DIASTOLIC BLOOD PRESSURE: 70 MMHG

## 2024-11-05 DIAGNOSIS — R00.0 TACHYCARDIA: ICD-10-CM

## 2024-11-05 DIAGNOSIS — I10 BENIGN ESSENTIAL HYPERTENSION: ICD-10-CM

## 2024-11-05 DIAGNOSIS — R97.20 ELEVATED PROSTATE SPECIFIC ANTIGEN (PSA): ICD-10-CM

## 2024-11-05 PROCEDURE — 99213 OFFICE O/P EST LOW 20 MIN: CPT | Performed by: INTERNAL MEDICINE

## 2024-11-05 PROCEDURE — 3074F SYST BP LT 130 MM HG: CPT | Performed by: INTERNAL MEDICINE

## 2024-11-05 PROCEDURE — 3078F DIAST BP <80 MM HG: CPT | Performed by: INTERNAL MEDICINE

## 2024-11-05 NOTE — PATIENT INSTRUCTIONS
Continue all current medications as prescribed.   Please have blood work drawn at your earliest convenience (fasting). You will be notified of the results once they become available.    Followup with Donna Cr NP, in 6 months.      If you have any questions or cardiac concerns, please call our office at [phone].

## 2024-11-06 ENCOUNTER — OFFICE VISIT (OUTPATIENT)
Dept: ORTHOPEDIC SURGERY | Facility: HOSPITAL | Age: 74
End: 2024-11-06
Payer: MEDICARE

## 2024-11-06 VITALS — BODY MASS INDEX: 31.21 KG/M2 | HEIGHT: 70 IN | WEIGHT: 218 LBS

## 2024-11-06 DIAGNOSIS — S72.001A FRACTURE, PROXIMAL FEMUR, RIGHT, CLOSED, INITIAL ENCOUNTER: ICD-10-CM

## 2024-11-06 DIAGNOSIS — M79.651 ACUTE PAIN OF RIGHT THIGH: ICD-10-CM

## 2024-11-06 PROCEDURE — 99214 OFFICE O/P EST MOD 30 MIN: CPT | Performed by: SPECIALIST

## 2024-11-06 PROCEDURE — E0143 WALKER FOLDING WHEELED W/O S: HCPCS | Performed by: SPECIALIST

## 2024-11-06 PROCEDURE — 99204 OFFICE O/P NEW MOD 45 MIN: CPT | Performed by: SPECIALIST

## 2024-11-06 PROCEDURE — 1159F MED LIST DOCD IN RCRD: CPT | Performed by: SPECIALIST

## 2024-11-06 NOTE — PROGRESS NOTES
NPV - RIGHT thigh pain that radiates into knee and lower back. Patient reports having numbness in his foot. Pain started 2 weeks ago on 10/23/24. Went to urgent care on 10/28/24 got a XR of his hip. He Went to ER on 11-1 and got a CT of the RT femur.  He recounted how this began with him tripping when he went to get off his lawnmower hopping on his right foot before catching himself.  Denies any bowel or bladder changes or other constitutional symptoms.  Percocet in the ER and he states that that is the only thing that seems to help the pain.    Exam: Patient alert and oriented x 3 no obvious distress.  Right lower extremity exam shows no external signs of erythema ecchymosis or undue swelling.  Does have a negative straight right leg raise and distal neurovascular status appears intact.  Passive motion of the hip and knee is intact.  No instability no significant pain with joint motion.  Describes pain to palpation in the mid anterior and lateral thigh region with no palpable defects or mass.    Reviewed plain radiographs of the hip and femur, and a CT scan of the femur from urgent care and ED.  Some mixed sclerotic changes to the femoral bone but no obvious acute abnormalities.  Per the radiologist reading the CT they recommended a follow-up MRI based on the symptoms.    Assessment plan: Right lower extremity pain.  Will definitely follow the advice of the radiology department and order a stat MRI of the right femur to rule out a stress fracture or metabolic bone disorder.  Pending the results of that the other differential is a form of lumbar radiculopathy.  Will contact the patient when study is complete.  We fit the patient with a walker today at partially offload the femur and help assist in preventing falls.  We also recommended against ongoing Percocet usage, but if he believes this is required to control pain then he to have to discuss with primary care/pain management.

## 2024-11-07 ENCOUNTER — HOSPITAL ENCOUNTER (OUTPATIENT)
Dept: RADIOLOGY | Facility: HOSPITAL | Age: 74
Discharge: HOME | End: 2024-11-07
Payer: MEDICARE

## 2024-11-07 DIAGNOSIS — S72.001A FRACTURE, PROXIMAL FEMUR, RIGHT, CLOSED, INITIAL ENCOUNTER: ICD-10-CM

## 2024-11-07 PROCEDURE — 73718 MRI LOWER EXTREMITY W/O DYE: CPT | Mod: RT

## 2024-11-08 ENCOUNTER — TELEPHONE (OUTPATIENT)
Dept: ORTHOPEDIC SURGERY | Facility: HOSPITAL | Age: 74
End: 2024-11-08
Payer: MEDICARE

## 2024-11-08 ENCOUNTER — TELEPHONE (OUTPATIENT)
Dept: CARDIOLOGY | Facility: HOSPITAL | Age: 74
End: 2024-11-08
Payer: MEDICARE

## 2024-11-08 NOTE — TELEPHONE ENCOUNTER
----- Message from Nurse Alfreda HARRIS sent at 11/8/2024  1:21 PM EST -----  Regarding: Refill  Patient is requesting a refill of  metoprolol succ 50 mg bid and amlodipine 5 mg daily to be sent to [facility] in [place].

## 2024-11-08 NOTE — TELEPHONE ENCOUNTER
Called patient and let him know that the amlodipine was sent in 10/21/2024.  The metoprolol ER was sent in as 100 mg one pill a day  on 10/21/2024.  He is telling me he is on his way out of town and will not be back until early next week.  He will look to see as perhaps he picked up the meds and set them aside after he picked them up and was taking out of old bottles.  He will let us know if he has any other issues.

## 2024-11-11 NOTE — TELEPHONE ENCOUNTER
Patient aware he is scheduled with Dr. Kahn on the . That day might not work for him due to a . He will call back

## 2024-11-14 ENCOUNTER — APPOINTMENT (OUTPATIENT)
Dept: VASCULAR MEDICINE | Facility: HOSPITAL | Age: 74
End: 2024-11-14
Payer: MEDICARE

## 2024-11-18 ENCOUNTER — TELEPHONE (OUTPATIENT)
Dept: CARDIOLOGY | Facility: HOSPITAL | Age: 74
End: 2024-11-18
Payer: MEDICARE

## 2024-11-18 ENCOUNTER — APPOINTMENT (OUTPATIENT)
Dept: ORTHOPEDIC SURGERY | Facility: HOSPITAL | Age: 74
End: 2024-11-18
Payer: MEDICARE

## 2024-11-18 NOTE — TELEPHONE ENCOUNTER
Patient called and stated they need a   DULoxetine (Cymbalta) 60 mg DR capsule   Refill.     Thank you!

## 2024-11-20 ENCOUNTER — HOSPITAL ENCOUNTER (OUTPATIENT)
Dept: RADIOLOGY | Facility: EXTERNAL LOCATION | Age: 74
Discharge: HOME | End: 2024-11-20

## 2024-11-20 ENCOUNTER — OFFICE VISIT (OUTPATIENT)
Dept: ORTHOPEDIC SURGERY | Facility: HOSPITAL | Age: 74
End: 2024-11-20
Payer: MEDICARE

## 2024-11-20 ENCOUNTER — TELEPHONE (OUTPATIENT)
Dept: ORTHOPEDIC SURGERY | Facility: HOSPITAL | Age: 74
End: 2024-11-20
Payer: MEDICARE

## 2024-11-20 DIAGNOSIS — M16.11 ARTHRITIS OF RIGHT HIP: Primary | ICD-10-CM

## 2024-11-20 DIAGNOSIS — M25.551 ACUTE RIGHT HIP PAIN: ICD-10-CM

## 2024-11-20 PROCEDURE — 20611 DRAIN/INJ JOINT/BURSA W/US: CPT | Mod: RT | Performed by: STUDENT IN AN ORGANIZED HEALTH CARE EDUCATION/TRAINING PROGRAM

## 2024-11-20 PROCEDURE — 99204 OFFICE O/P NEW MOD 45 MIN: CPT | Performed by: FAMILY MEDICINE

## 2024-11-20 PROCEDURE — 1036F TOBACCO NON-USER: CPT | Performed by: FAMILY MEDICINE

## 2024-11-20 PROCEDURE — 1159F MED LIST DOCD IN RCRD: CPT | Performed by: FAMILY MEDICINE

## 2024-11-20 PROCEDURE — 2500000004 HC RX 250 GENERAL PHARMACY W/ HCPCS (ALT 636 FOR OP/ED): Performed by: FAMILY MEDICINE

## 2024-11-20 PROCEDURE — 99214 OFFICE O/P EST MOD 30 MIN: CPT | Performed by: FAMILY MEDICINE

## 2024-11-20 RX ORDER — TRIAMCINOLONE ACETONIDE 40 MG/ML
80 INJECTION, SUSPENSION INTRA-ARTICULAR; INTRAMUSCULAR
Status: COMPLETED | OUTPATIENT
Start: 2024-11-20 | End: 2024-11-20

## 2024-11-20 RX ORDER — LIDOCAINE HYDROCHLORIDE 10 MG/ML
4 INJECTION, SOLUTION INFILTRATION; PERINEURAL
Status: COMPLETED | OUTPATIENT
Start: 2024-11-20 | End: 2024-11-20

## 2024-11-20 NOTE — PROGRESS NOTES
** Please excuse any errors in grammar or translation related to this dictation. Voice recognition software was utilized to prepare this document. 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  After reviewing his imaging (x-ray, CT, MRI) which contain multiple findings but with clinical correlation, he most likely has pain from right hip osteoarthritis.    Dx: Right hip osteoarthritis     Clinical presentation is most consistent with right hip arthritis.  Discuss with patient the nature of this disease and how it can be progressive. Discuss how symptoms can wax and wane in severity.  Non-operative treatment options reviewed below.  - Activity modifications as needed to include use of cane/walker or braces.  He currently uses a cane.  - Prescription and otc analgesics to include but not limited to APAP, ibuprofen, naproxen, diclofenac gel.  Discussed caution with NSAIDs use (reported was using ibuprofen 800 mg 4 times a day).  Recommended Tylenol 1000 mg 3 times a day.  - Steroid injection.  Performed ultrasound-guided right intra-articular hip corticosteroid injection today, 11/20/2024  - Referral to arthroplastic surgeon for potential joint replacement.  He has an appointment with Dr. Green already scheduled on 12/5/2024.  Informed patient that steroid injection can be repeated every 3 or more months as symptoms dictate. Follow-up as needed for ongoing management.  He will see joint replacement surgery on 12/5/2024.    All questions answered and patient is agreeable to this plan.     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  This complaint has been ongoing for since mid October 2024.  Started after he was getting off he  left leg catching on it and twisting and hoping on right leg to catch his balance.  He was seen at Urgent Care and ER, has had xray, CT, and MRI.  Pain is  most prominent at lateral and posterior hip though it is also present at groin intermittently.  He was having sciatic pain down the posterior leg but that has resolved. No reported sensory changes or weakness, joint locking, or feeling of instability. Symptoms are aggravated by walking, figure 4 position, putting on shoes and socks. To date, patient has tried a variety of treatments to include aleve and tylenol, cane with little sustained effect. Previously saw Dr. Blake Crabtree for this with last visit being 11/6/24.  Denies previous surgery to this site.     Patient Active Problem List   Diagnosis    Abnormal electrocardiography    Mixed anxiety depressive disorder    Benign essential hypertension    Gout    Hyperlipidemia    Obstructive sleep apnea syndrome    Dyspnea on exertion    Tachycardia    Detached retina    Cellulitis    Disease due to severe acute respiratory syndrome coronavirus 2 (SARS-CoV-2)     Past Surgical History:   Procedure Laterality Date    OTHER SURGICAL HISTORY  07/22/2021    Eye surgery     Current Outpatient Medications on File Prior to Visit   Medication Sig Dispense Refill    acetaminophen (Tylenol) 500 mg tablet Take 2 tablets (1,000 mg) by mouth once daily at bedtime.      amLODIPine (Norvasc) 10 mg tablet Take 0.5 tablets (5 mg) by mouth once daily. 45 tablet 3    aspirin 81 mg EC tablet Take 1 tablet (81 mg) by mouth once daily.      atorvastatin (Lipitor) 20 mg tablet Take 1 tablet (20 mg) by mouth once daily at bedtime.      busPIRone (Buspar) 10 mg tablet Take 1 tablet (10 mg) by mouth 2 times a day.      citalopram (CeleXA) 20 mg tablet Take 1 tablet (20 mg) by mouth once daily in the morning. Take before meals.      clonazePAM (KlonoPIN) 2 mg tablet Take 1 tablet (2 mg) by mouth once daily.      colchicine 0.6 mg tablet Take by mouth once daily.      DULoxetine (Cymbalta) 60 mg DR capsule Take 1 capsule (60 mg) by mouth early in the morning..      indomethacin (Indocin) 50  mg capsule TAKE 1 CAPSULE BY MOUTH WITH FOOD OR MILK TWICE DAILY WITH GOUTY SPELL      lisinopril 40 mg tablet Take 1 tablet (40 mg) by mouth once daily.      meloxicam (Mobic) 7.5 mg tablet Take 1 tablet (7.5 mg) by mouth once daily. 30 tablet 0    metoprolol succinate XL (Toprol-XL) 100 mg 24 hr tablet Take 1 tablet (100 mg) by mouth once daily. 90 tablet 3    QUEtiapine (SEROquel) 25 mg tablet Take 1 tablet (25 mg) by mouth.      rOPINIRole (Requip) 0.5 mg tablet TAKE 1 TABLET BY MOUTH EVERY NIGHT 1 TO 3 HOURS BEFORE BEDTIME      tamsulosin (Flomax) 0.4 mg 24 hr capsule Take 1 capsule (0.4 mg) by mouth once daily.       No current facility-administered medications on file prior to visit.     Exam:  Right Hip Exam:  Antalgic gait with cane  No warmth, erythema or ecchymosis overlying.  Normal abduction, adduction, external rotation with pain.  Limited internal rotation with pain.  TTP over greater trochanter, glute tendons, proximal ITB, SI joint  NTTP lumbar spine  4+/5 strength of hip flexion, abduction, & adduction due to pain  SILT  [ + ]Log roll pain, [ + ]FADIR, [ - ]ORLIN, [ + ][place],  [ + ]Scour  [ + ] C-sign    General Exam:  Constitutional - NAD, AAO x 3, conversing appropriately.  HEENT- Normocephalic and atraumatic. No facial deformities. Hearing grossly normal.  Lungs - Breathing non-labored with normal rate. No accessory muscle use.  CV - Extremities warm and well-perfused, brisk capillary refill present.   Neuro - CN II-XII grossly intact.      Results:  10/28/2024 x-ray right hip revealing moderate degenerative changes of the right hip.   11/1/2024 CT right hip mild to moderate degenerative changes in the right hip without acute fracture though had a ill-defined mild sclerotic change which recommended MRI for further evaluation.  11/7/2024 MRI right femur revealed ligamentum teres avulsion from the femoral head with associated effusion, possible posterior labral tear of indeterminate age,  grade 1 strain right iliopsoas tendinosis, moderate right hip osteoarthritis, mild right hamstring tendinosis      Lab Results   Component Value Date    CREATININE 0.88 10/02/2024    EGFR 90 10/02/2024     Procedure:  Patient ID: Jhon Blair is a 74 y.o. male.    L Inj/Asp: R hip joint on 11/20/2024 3:06 PM  Indications: pain  Details: 21 G needle, ultrasound-guided anterior approach  Medications: 80 mg triamcinolone acetonide 40 mg/mL; 4 mL lidocaine 10 mg/mL (1 %)  Outcome: tolerated well, no immediate complications    Procedure risk factors to include increased pain, bleeding, infection, neurovascular injury, soft tissue injury, transient elevation of blood glucose and blood pressure, and adverse reaction to medication were discussed with the patient. Patient understands there is a moderate risk of morbidity from undergoing the procedure.  Procedure, treatment alternatives, risks and benefits explained, specific risks discussed. Consent was given by the patient. Immediately prior to procedure a time out was called to verify the correct patient, procedure, equipment, support staff and site/side marked as required. Patient was prepped and draped in the usual sterile fashion.

## 2024-11-20 NOTE — TELEPHONE ENCOUNTER
Called patient to let him know that Dr. Kahn doesn't see his type of issues so that we had moved him to Dr. Neil who would see him at the same time and location today.  Answered his questions about location/ entrance, etc.  He asked about pain meds, etc. I told him that Dr. Neil would have to evaluate him and go from there. CT

## 2024-12-05 ENCOUNTER — APPOINTMENT (OUTPATIENT)
Dept: ORTHOPEDIC SURGERY | Facility: CLINIC | Age: 74
End: 2024-12-05
Payer: MEDICARE

## 2025-02-01 LAB
ALBUMIN SERPL-MCNC: 3.8 G/DL (ref 3.6–5.1)
ALP SERPL-CCNC: 70 U/L (ref 35–144)
ALT SERPL-CCNC: 8 U/L (ref 9–46)
ANION GAP SERPL CALCULATED.4IONS-SCNC: 10 MMOL/L (CALC) (ref 7–17)
AST SERPL-CCNC: 15 U/L (ref 10–35)
BASOPHILS # BLD AUTO: 59 CELLS/UL (ref 0–200)
BASOPHILS NFR BLD AUTO: 0.8 %
BILIRUB SERPL-MCNC: 0.9 MG/DL (ref 0.2–1.2)
BUN SERPL-MCNC: 12 MG/DL (ref 7–25)
CALCIUM SERPL-MCNC: 8.9 MG/DL (ref 8.6–10.3)
CHLORIDE SERPL-SCNC: 104 MMOL/L (ref 98–110)
CHOLEST SERPL-MCNC: 161 MG/DL
CHOLEST/HDLC SERPL: 3.8 (CALC)
CO2 SERPL-SCNC: 27 MMOL/L (ref 20–32)
CREAT SERPL-MCNC: 0.77 MG/DL (ref 0.7–1.28)
EGFRCR SERPLBLD CKD-EPI 2021: 93 ML/MIN/1.73M2
EOSINOPHIL # BLD AUTO: 252 CELLS/UL (ref 15–500)
EOSINOPHIL NFR BLD AUTO: 3.4 %
ERYTHROCYTE [DISTWIDTH] IN BLOOD BY AUTOMATED COUNT: 14.1 % (ref 11–15)
GLUCOSE SERPL-MCNC: 85 MG/DL (ref 65–99)
HCT VFR BLD AUTO: 46.9 % (ref 38.5–50)
HDLC SERPL-MCNC: 42 MG/DL
HGB BLD-MCNC: 15 G/DL (ref 13.2–17.1)
LDLC SERPL CALC-MCNC: 96 MG/DL (CALC)
LYMPHOCYTES # BLD AUTO: 2331 CELLS/UL (ref 850–3900)
LYMPHOCYTES NFR BLD AUTO: 31.5 %
MCH RBC QN AUTO: 29.6 PG (ref 27–33)
MCHC RBC AUTO-ENTMCNC: 32 G/DL (ref 32–36)
MCV RBC AUTO: 92.7 FL (ref 80–100)
MONOCYTES # BLD AUTO: 466 CELLS/UL (ref 200–950)
MONOCYTES NFR BLD AUTO: 6.3 %
NEUTROPHILS # BLD AUTO: 4292 CELLS/UL (ref 1500–7800)
NEUTROPHILS NFR BLD AUTO: 58 %
NONHDLC SERPL-MCNC: 119 MG/DL (CALC)
PLATELET # BLD AUTO: 217 THOUSAND/UL (ref 140–400)
PMV BLD REES-ECKER: 9.4 FL (ref 7.5–12.5)
POTASSIUM SERPL-SCNC: 4.3 MMOL/L (ref 3.5–5.3)
PROT SERPL-MCNC: 6.2 G/DL (ref 6.1–8.1)
RBC # BLD AUTO: 5.06 MILLION/UL (ref 4.2–5.8)
SODIUM SERPL-SCNC: 141 MMOL/L (ref 135–146)
TRIGL SERPL-MCNC: 131 MG/DL
WBC # BLD AUTO: 7.4 THOUSAND/UL (ref 3.8–10.8)

## 2025-02-24 ENCOUNTER — OFFICE VISIT (OUTPATIENT)
Dept: ORTHOPEDIC SURGERY | Facility: HOSPITAL | Age: 75
End: 2025-02-24
Payer: MEDICARE

## 2025-02-24 ENCOUNTER — HOSPITAL ENCOUNTER (OUTPATIENT)
Dept: RADIOLOGY | Facility: EXTERNAL LOCATION | Age: 75
Discharge: HOME | End: 2025-02-24

## 2025-02-24 VITALS — BODY MASS INDEX: 31.21 KG/M2 | WEIGHT: 218 LBS | HEIGHT: 70 IN

## 2025-02-24 DIAGNOSIS — M16.11 ARTHRITIS OF RIGHT HIP: ICD-10-CM

## 2025-02-24 PROCEDURE — 20611 DRAIN/INJ JOINT/BURSA W/US: CPT | Mod: RT | Performed by: FAMILY MEDICINE

## 2025-02-24 PROCEDURE — 99213 OFFICE O/P EST LOW 20 MIN: CPT | Performed by: FAMILY MEDICINE

## 2025-02-24 PROCEDURE — 99213 OFFICE O/P EST LOW 20 MIN: CPT | Mod: 25 | Performed by: FAMILY MEDICINE

## 2025-02-24 PROCEDURE — 2500000004 HC RX 250 GENERAL PHARMACY W/ HCPCS (ALT 636 FOR OP/ED): Performed by: FAMILY MEDICINE

## 2025-02-24 PROCEDURE — 1159F MED LIST DOCD IN RCRD: CPT | Performed by: FAMILY MEDICINE

## 2025-02-24 PROCEDURE — 1125F AMNT PAIN NOTED PAIN PRSNT: CPT | Performed by: FAMILY MEDICINE

## 2025-02-24 RX ORDER — LIDOCAINE HYDROCHLORIDE 10 MG/ML
4 INJECTION, SOLUTION INFILTRATION; PERINEURAL
Status: COMPLETED | OUTPATIENT
Start: 2025-02-24 | End: 2025-02-24

## 2025-02-24 RX ORDER — TRIAMCINOLONE ACETONIDE 40 MG/ML
80 INJECTION, SUSPENSION INTRA-ARTICULAR; INTRAMUSCULAR
Status: COMPLETED | OUTPATIENT
Start: 2025-02-24 | End: 2025-02-24

## 2025-02-24 RX ADMIN — LIDOCAINE HYDROCHLORIDE 4 ML: 10 INJECTION, SOLUTION INFILTRATION; PERINEURAL at 10:17

## 2025-02-24 RX ADMIN — TRIAMCINOLONE ACETONIDE 80 MG: 200 INJECTION, SUSPENSION INTRA-ARTICULAR; INTRAMUSCULAR at 10:17

## 2025-02-24 ASSESSMENT — PAIN - FUNCTIONAL ASSESSMENT: PAIN_FUNCTIONAL_ASSESSMENT: 0-10

## 2025-02-24 ASSESSMENT — PAIN SCALES - GENERAL: PAINLEVEL_OUTOF10: 2

## 2025-02-24 ASSESSMENT — PAIN DESCRIPTION - DESCRIPTORS: DESCRIPTORS: ACHING

## 2025-02-24 NOTE — PROGRESS NOTES
** Please excuse any errors in grammar or translation related to this dictation. Voice recognition software was utilized to prepare this document. **    Assessment & Plan:  Patient here for ongoing management of right hip arthritis. Given previous positive response to steroid injection, offered to repeat today which patient was agreeable to have completed.   Non-operative treatment options reviewed below.  - Maintaining a healthy weight: Every pound of bodyweight is about 4-5 pounds through the lower extremity.   - Activity modifications as needed to include use of cane/walker or braces.  - Analgesics to include but not limited to Tylenol up to 3g daily.  - Steroid injection.  Elected to have repeated today.  - Referral to arthroplastic surgeon for potential joint replacement.   Informed patient that steroid injection can be repeated every 3 or more months as symptoms dictate.  Follow-up as needed for ongoing management. All questions answered and patient is agreeable to this plan.       Chief complaint:  Right hip pain  HPI:  02/24/25: Patient following up for right hip pain.  He was last seen for this in November 2024.  He reports intra-articular steroid injection helped immensely.  He is now able to ambulate with minimal discomfort without use of cane or walker which he had required previously.  Mild pain restarted in the past 1 to 2 weeks without subsequent injury.  He had been scheduled to speak with an orthopedic surgeon about his hip but canceled the appointment given the success he had with the injection.      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  This complaint has been ongoing for since mid October 2024.  Started after he was getting off he  left leg catching on it and twisting and hoping on right leg to catch his balance.  He was seen at Urgent Care and ER, has had xray, CT, and MRI.  Pain is most prominent at lateral and posterior hip though it is also present at  groin intermittently.  He was having sciatic pain down the posterior leg but that has resolved. No reported sensory changes or weakness, joint locking, or feeling of instability. Symptoms are aggravated by walking, figure 4 position, putting on shoes and socks. To date, patient has tried a variety of treatments to include aleve and tylenol, cane with little sustained effect. Previously saw Dr. Blake Crabtree for this with last visit being 11/6/24.  Denies previous surgery to this site.     Patient Active Problem List   Diagnosis    Abnormal electrocardiography    Mixed anxiety depressive disorder    Benign essential hypertension    Gout    Hyperlipidemia    Obstructive sleep apnea syndrome    Dyspnea on exertion    Tachycardia    Detached retina    Cellulitis    Disease due to severe acute respiratory syndrome coronavirus 2 (SARS-CoV-2)     Past Surgical History:   Procedure Laterality Date    OTHER SURGICAL HISTORY  07/22/2021    Eye surgery     Current Outpatient Medications on File Prior to Visit   Medication Sig Dispense Refill    acetaminophen (Tylenol) 500 mg tablet Take 2 tablets (1,000 mg) by mouth once daily at bedtime.      amLODIPine (Norvasc) 10 mg tablet Take 0.5 tablets (5 mg) by mouth once daily. 45 tablet 3    aspirin 81 mg EC tablet Take 1 tablet (81 mg) by mouth once daily.      atorvastatin (Lipitor) 20 mg tablet Take 1 tablet (20 mg) by mouth once daily at bedtime.      busPIRone (Buspar) 10 mg tablet Take 1 tablet (10 mg) by mouth 2 times a day.      citalopram (CeleXA) 20 mg tablet Take 1 tablet (20 mg) by mouth once daily in the morning. Take before meals.      clonazePAM (KlonoPIN) 2 mg tablet Take 1 tablet (2 mg) by mouth once daily.      colchicine 0.6 mg tablet Take by mouth once daily.      DULoxetine (Cymbalta) 60 mg DR capsule Take 1 capsule (60 mg) by mouth early in the morning..      indomethacin (Indocin) 50 mg capsule TAKE 1 CAPSULE BY MOUTH WITH FOOD OR MILK TWICE DAILY WITH  JADEN NATHAN      lisinopril 40 mg tablet Take 1 tablet (40 mg) by mouth once daily.      metoprolol succinate XL (Toprol-XL) 100 mg 24 hr tablet Take 1 tablet (100 mg) by mouth once daily. 90 tablet 3    QUEtiapine (SEROquel) 25 mg tablet Take 1 tablet (25 mg) by mouth.      rOPINIRole (Requip) 0.5 mg tablet TAKE 1 TABLET BY MOUTH EVERY NIGHT 1 TO 3 HOURS BEFORE BEDTIME      tamsulosin (Flomax) 0.4 mg 24 hr capsule Take 1 capsule (0.4 mg) by mouth once daily.       No current facility-administered medications on file prior to visit.     Exam:  Right Hip Exam:  Normal gait without cane assistance.  No warmth, erythema or ecchymosis overlying.  Flexion greater than 90 degrees.  IR 20 degrees, ER 40 degrees.  Mild TTP over greater trochanter  NTTP lumbar spine  4+/5 strength of hip flexion, abduction, & adduction due to pain  SILT  [ - ]Log roll pain, [ + ]FADIR, [ - ]ORLIN, [ - ][facility]    General Exam:  Constitutional - NAD, AAO x 3, conversing appropriately.  HEENT- Normocephalic and atraumatic. No facial deformities. Hearing grossly normal.  Lungs - Breathing non-labored with normal rate. No accessory muscle use.  CV - Extremities warm and well-perfused, brisk capillary refill present.   Neuro - CN II-XII grossly intact.      Results:  10/28/2024 x-ray right hip revealing moderate degenerative changes of the right hip.   11/1/2024 CT right hip mild to moderate degenerative changes in the right hip without acute fracture though had a ill-defined mild sclerotic change which recommended MRI for further evaluation.  11/7/2024 MRI right femur revealed ligamentum teres avulsion from the femoral head with associated effusion, possible posterior labral tear of indeterminate age, grade 1 strain right iliopsoas tendinosis, moderate right hip osteoarthritis, mild right hamstring tendinosis      Lab Results   Component Value Date    CREATININE 0.77 01/31/2025    EGFR 93 01/31/2025     Procedure:  Patient ID: Jhon KENNY Blair  is a 74 y.o. male.    L Inj/Asp: R hip joint on 2/24/2025 10:17 AM  Indications: pain  Details: 22 G (spinal) needle, ultrasound-guided anterior approach  Medications: 80 mg triamcinolone acetonide 40 mg/mL; 4 mL lidocaine 10 mg/mL (1 %)  Outcome: tolerated well, no immediate complications    Procedure risk factors to include increased pain, bleeding, infection, neurovascular injury, soft tissue injury, progressive cartilage loss, transient elevation of blood glucose and blood pressure, and adverse reaction to medication were discussed with the patient. Patient understands there is a moderate risk of morbidity from undergoing the procedure.  Procedure, treatment alternatives, risks and benefits explained, specific risks discussed. Consent was given by the patient. Immediately prior to procedure a time out was called to verify the correct patient, procedure, equipment, support staff and site/side marked as required. Patient was prepped and draped in the usual sterile fashion.

## 2025-05-01 PROBLEM — R93.1 ELEVATED CORONARY ARTERY CALCIUM SCORE: Status: ACTIVE | Noted: 2025-05-01

## 2025-05-01 ASSESSMENT — ENCOUNTER SYMPTOMS
SYNCOPE: 0
PALPITATIONS: 0
HEMATOCHEZIA: 0
ORTHOPNEA: 0
WHEEZING: 0
NEAR-SYNCOPE: 0
VOMITING: 0
COUGH: 0
IRREGULAR HEARTBEAT: 0
SHORTNESS OF BREATH: 0
HEMATURIA: 0
ALTERED MENTAL STATUS: 0
DYSPNEA ON EXERTION: 0
CHILLS: 0
FEVER: 0
NAUSEA: 0

## 2025-05-01 NOTE — PATIENT INSTRUCTIONS
Recommend Mediterranean style of eating  Continue current medications  Recommend follow up regarding CPAP and treatment of sleep apnea  Follow-up with Dr. Razo in 6 months  If you have any questions or cardiac concerns, please call our office at [phone].

## 2025-05-01 NOTE — PROGRESS NOTES
Chief Complaint/Reason for Visit:  No chief complaint on file. 6 month cardiovascular follow up    History Of Present Illness:    Jhon Blair is a 74 y.o. male that presents to the office for 6 month follow up.    Taking medications as prescribed.     PMH is significant for anxiety/depression, hyperlipidemia, HTN, COVID-19 requiring intubation and PHIL.     He is a retired paramedic  - worked for 26 years.    He reports that he had COVID-19 in 2020 requiring intubation and states that what saved him was the ICU provider turned him prone for 36 hours.  He has short term memory issues and balance issues every since having COVID-19.     He lost vision right eye after getting hit with a piece of wood.     Overall feeling well and able to do his usual activities at home.     Past Medical History:  He has a past medical history of COVID-19 and Personal history of other specified conditions.    Past Surgical History:  He has a past surgical history that includes Other surgical history (07/22/2021).      Social History:  He reports that he has never smoked. He has never used smokeless tobacco. He reports that he does not currently use alcohol. He reports that he does not use drugs.    Family History:  Family History[1]     Allergies:  Patient has no known allergies.    Review of Systems   Constitutional: Negative for chills and fever.   Eyes:  Positive for vision loss in right eye (after getting hit by piece of wood).   Cardiovascular:  Positive for leg swelling (intermittent - worse with ambulation. Improves when he elevates his legs). Negative for chest pain, dyspnea on exertion, irregular heartbeat, near-syncope, orthopnea, palpitations and syncope.   Respiratory:  Negative for cough, shortness of breath and wheezing.    Musculoskeletal:  Positive for arthritis.   Gastrointestinal:  Negative for hematochezia, melena, nausea and vomiting.   Genitourinary:  Negative for hematuria.   Psychiatric/Behavioral:  Negative for  altered mental status.        Objective      Vitals reviewed.   Constitutional:       Appearance: Not in distress.   Pulmonary:      Effort: Pulmonary effort is normal.      Breath sounds: Normal breath sounds.   Cardiovascular:      PMI at left midclavicular line. Normal rate. Regular rhythm. S1 with normal intensity. S2 with normal intensity.       Murmurs: There is no murmur.   Edema:     Peripheral edema present.     Pretibial: bilateral trace pitting edema of the pretibial area.     Ankle: bilateral trace pitting edema of the ankle.     Feet: bilateral trace pitting edema of the feet.  Abdominal:      General: Bowel sounds are normal.   Skin:     General: Skin is warm and dry.   Psychiatric:         Attention and Perception: Attention normal.         Mood and Affect: Mood normal.         Behavior: Behavior is cooperative.         Current Outpatient Medications   Medication Instructions    acetaminophen (TYLENOL) 1,000 mg, Nightly    amLODIPine (NORVASC) 5 mg, oral, Daily    aspirin 81 mg EC tablet 1 tablet, Daily    atorvastatin (Lipitor) 20 mg tablet 1 tablet, Nightly    busPIRone (BUSPAR) 10 mg, 2 times daily    carbidopa-levodopa (Sinemet CR)  mg ER tablet 1 tablet, 2 times daily    clonazePAM (KLONOPIN) 2 mg, Daily    colchicine 0.6 mg tablet Daily    DULoxetine (Cymbalta) 60 mg DR capsule 1 capsule, Daily (0630)    indomethacin (Indocin) 50 mg capsule TAKE 1 CAPSULE BY MOUTH WITH FOOD OR MILK TWICE DAILY WITH GOUTY SPELL    lisinopril 40 mg tablet 1 tablet, Daily    metoprolol succinate XL (TOPROL-XL) 100 mg, oral, Daily    rOPINIRole (Requip) 0.5 mg tablet TAKE 1 TABLET BY MOUTH EVERY NIGHT 1 TO 3 HOURS BEFORE BEDTIME    tamsulosin (FLOMAX) 0.4 mg, Daily        Reviewed the following Labs:  CBC -  Lab Results   Component Value Date    WBC 7.4 01/31/2025    HGB 15.0 01/31/2025    HCT 46.9 01/31/2025    MCV 92.7 01/31/2025     01/31/2025       RENAL FUNCTION PANEL -   Lab Results   Component  "Value Date    GLUCOSE 85 01/31/2025     01/31/2025    K 4.3 01/31/2025     01/31/2025    CO2 27 01/31/2025    ANIONGAP 10 01/31/2025    BUN 12 01/31/2025    CREATININE 0.77 01/31/2025    GFRMALE 74 08/03/2023    CALCIUM 8.9 01/31/2025    ALBUMIN 3.8 01/31/2025        CMP -  Lab Results   Component Value Date    CALCIUM 8.9 01/31/2025    PROT 6.2 01/31/2025    ALBUMIN 3.8 01/31/2025    AST 15 01/31/2025    ALT 8 (L) 01/31/2025    ALKPHOS 70 01/31/2025    BILITOT 0.9 01/31/2025       LIPID PANEL -   Lab Results   Component Value Date    CHOL 161 01/31/2025    TRIG 131 01/31/2025    HDL 42 01/31/2025    CHHDL 3.8 01/31/2025    LDLF 79 08/03/2023    VLDL 22 08/03/2023    NHDL 119 01/31/2025     Lab Results   Component Value Date    LDLCALC 96 01/31/2025       No results found for: \"BNP\", \"HGBA1C\"    Lab Results   Component Value Date    TSH 0.54 10/22/2024       No results found for this or any previous visit.     Reviewed the following Cardiology Tests:    Echo 10/23/24:   1. The left ventricular systolic function is normal, with a visually estimated ejection fraction of 55-60%.   2. There is normal right ventricular global systolic function.   3. Mild aortic valve regurgitation.    Renal artery duplex 10/21/24  Renal Artery Duplex: Bilateral renal arteries demonstrate no evidence of hemodynamically significant stenosis. The bilateral renal veins are widely patent.  Right Renal Artery: Multiple renal cysts documented in the right kidney.  Left Renal Artery: Multiple renal cysts documented in the left kidney.    CT cardiac score 10/15/24  1. Coronary artery calcium score of 6.17*.     Holter monitor 3/14/24-3/21/24  Patient had a min HR of 41 bpm, max HR of 164 bpm, and avg HR of 56 bpm. Predominant underlying rhythm was Sinus Rhythm. 30 Supraventricular Tachycardia runs occurred, the run with the fastest interval lasting 9 beats with a max rate of 164 bpm, the longest lasting 12. 1 secs with an avg rate of " "119 bpm. Isolated SVEs were frequent (13.2%, 50193), SVE Couplets were rare (<1.0%, 590), and SVE Triplets were rare (<1.0%, 57). Isolated VEs were occasional (3.0%, 53101), and no VE Couplets or VE Triplets were present.    Stress test 7/10/2020:   1. No clinical or electrocardiographic evidence for ischemia at maximal infusion.  2. Normal Stress Test.  3. Correlate with myocardial perfusion imaging results.  4. Nuclear image results are reported separately.  Probably normal myocardial perfusion scan with no ischemia seen. Some  diaphragm attenuation artifact along the inferior wall is noted.  There is well-preserved LV function.     CT cardiac score 7/2/2020  1. Coronary artery calcium score of 23.  2. Few small right-sided pulmonary nodules measuring up to 5 mm,  likely benign.  No further follow-up is required, however, if the  patient has high risk factors for primary lung malignancy, follow-up  noncontrast CT scan chest in 12 months may be obtained.    Visit Vitals  /64 (BP Location: Right arm, Patient Position: Sitting, BP Cuff Size: Adult)   Pulse 75   Ht 1.778 m (5' 10\")   SpO2 94%   BMI 31.28 kg/m²   Smoking Status Never   BSA 2.21 m²       Assessment/Plan   The primary encounter diagnosis was Benign essential hypertension. Diagnoses of Elevated coronary artery calcium score, Hyperlipidemia, unspecified hyperlipidemia type, Tachycardia, and Elevated prostate specific antigen (PSA) were also pertinent to this visit.    1. HTN   Stable  Continue current antihypertensives: amlodipine 10 mg daily, lisinopril 40 mg daily and metoprolol succinate 100 mg daily   Spironolactone previously discontinued d/t sudden onset diaphoresis and lightheadedness  Metoprolol previously decreased from BID to once daily s/t bradycardia   Renal artery U/S 10/18/2024 negative for stenosis bilaterally     2. Elevated CT calcium score  CT Calcium Score was 23 in July 2020, but stress test negative for ischemia July 2020   CT " calcium score 10/15/2024 with total score of 6.17  TTE 10/23/2024 with LVEF 55-60%, normal RV systolic function, mild aortic regurgitation    3. Dyslipidemia  Recommend Mediterranean style of eating  Goal LDL <100.  Currently at goal.  Continue atorvastatin 20 mg daily.     4. Frequent PAC's, pSVT  Holter monitor March 2024 - 30 Supraventricular Tachycardia runs, the longest lasting 12. 1 secs with an avg rate of 119 bpm. Isolated SVEs were frequent (13.2%)  Limit caffeine, stress  Recommend wearing CPAP  Continue metoprolol succinate 100 mg daily    5. PHIL  Patient has not been wearing CPAP.  Encouraged him to wear CPAP  Offered referral to sleep medicine and he declined.  He states he will discuss settings with PCP/machine company as he hasn't been wearing as he feels his max pressure it too high.    Donna Cr, APRN-CNP          [1]   Family History  Problem Relation Name Age of Onset    Heart attack Father

## 2025-05-05 ENCOUNTER — OFFICE VISIT (OUTPATIENT)
Dept: CARDIOLOGY | Facility: HOSPITAL | Age: 75
End: 2025-05-05
Payer: MEDICARE

## 2025-05-05 ENCOUNTER — TELEPHONE (OUTPATIENT)
Dept: CARDIOLOGY | Facility: HOSPITAL | Age: 75
End: 2025-05-05

## 2025-05-05 VITALS
DIASTOLIC BLOOD PRESSURE: 64 MMHG | HEART RATE: 75 BPM | BODY MASS INDEX: 31.28 KG/M2 | SYSTOLIC BLOOD PRESSURE: 108 MMHG | OXYGEN SATURATION: 94 % | HEIGHT: 70 IN

## 2025-05-05 DIAGNOSIS — R00.0 TACHYCARDIA: ICD-10-CM

## 2025-05-05 DIAGNOSIS — I49.1 PAC (PREMATURE ATRIAL CONTRACTION): ICD-10-CM

## 2025-05-05 DIAGNOSIS — Z95.0 PACEMAKER: ICD-10-CM

## 2025-05-05 DIAGNOSIS — I47.10 PAROXYSMAL SUPRAVENTRICULAR TACHYCARDIA (CMS-HCC): ICD-10-CM

## 2025-05-05 DIAGNOSIS — R60.0 BILATERAL LEG EDEMA: ICD-10-CM

## 2025-05-05 DIAGNOSIS — E78.5 HYPERLIPIDEMIA, UNSPECIFIED HYPERLIPIDEMIA TYPE: ICD-10-CM

## 2025-05-05 DIAGNOSIS — R93.1 ELEVATED CORONARY ARTERY CALCIUM SCORE: ICD-10-CM

## 2025-05-05 DIAGNOSIS — I10 BENIGN ESSENTIAL HYPERTENSION: Primary | ICD-10-CM

## 2025-05-05 DIAGNOSIS — R97.20 ELEVATED PROSTATE SPECIFIC ANTIGEN (PSA): ICD-10-CM

## 2025-05-05 PROCEDURE — 1036F TOBACCO NON-USER: CPT | Performed by: NURSE PRACTITIONER

## 2025-05-05 PROCEDURE — 99214 OFFICE O/P EST MOD 30 MIN: CPT | Performed by: NURSE PRACTITIONER

## 2025-05-05 PROCEDURE — 1160F RVW MEDS BY RX/DR IN RCRD: CPT | Performed by: NURSE PRACTITIONER

## 2025-05-05 PROCEDURE — 3078F DIAST BP <80 MM HG: CPT | Performed by: NURSE PRACTITIONER

## 2025-05-05 PROCEDURE — 3074F SYST BP LT 130 MM HG: CPT | Performed by: NURSE PRACTITIONER

## 2025-05-05 PROCEDURE — 1159F MED LIST DOCD IN RCRD: CPT | Performed by: NURSE PRACTITIONER

## 2025-05-05 RX ORDER — METOPROLOL SUCCINATE 100 MG/1
100 TABLET, EXTENDED RELEASE ORAL DAILY
Qty: 90 TABLET | Refills: 3 | Status: SHIPPED | OUTPATIENT
Start: 2025-05-05 | End: 2026-05-05

## 2025-05-05 RX ORDER — AMLODIPINE BESYLATE 10 MG/1
5 TABLET ORAL DAILY
Qty: 45 TABLET | Refills: 3 | Status: SHIPPED | OUTPATIENT
Start: 2025-05-05 | End: 2026-05-05

## 2025-05-05 RX ORDER — CARBIDOPA AND LEVODOPA 50; 200 MG/1; MG/1
1 TABLET, EXTENDED RELEASE ORAL 2 TIMES DAILY
COMMUNITY
Start: 2025-03-04

## 2025-05-05 ASSESSMENT — ENCOUNTER SYMPTOMS: RIGHT EYE: 1

## 2025-05-05 NOTE — TELEPHONE ENCOUNTER
Spoke with patient at check out, he is going to call in with an updated PCP.    Thank you!  Aroldo BALDERRAMA

## 2025-05-07 ENCOUNTER — TELEPHONE (OUTPATIENT)
Dept: CARDIOLOGY | Facility: CLINIC | Age: 75
End: 2025-05-07
Payer: MEDICARE

## 2025-05-07 DIAGNOSIS — R60.0 LOWER LEG EDEMA: ICD-10-CM

## 2025-05-07 DIAGNOSIS — G47.33 OBSTRUCTIVE SLEEP APNEA SYNDROME: ICD-10-CM

## 2025-05-07 DIAGNOSIS — R60.9 SWELLING: Primary | ICD-10-CM

## 2025-05-07 NOTE — TELEPHONE ENCOUNTER
"RN returned patient call and spoke with patient, patient reports he would like to discuss the notes with KIM Cr. Patient states that he saw in her notes that he had 30 runs of SVT, per patient this was never addressed with him previously.  Patient also concerned on treatment for SVT. Per KIM Cr notes patient taking Toprol XL 100mg. Patient also concerned with his note stating \" Chronically ill-appearing.\" Per patient this is not a medical term, and it was not mentioned by provider. Patient would like to know how he appears chronically ill. Patient also concerned with the note reporting leg swelling. Patient reports any time his legs swell he can raise them and it goes away. Patient reports during his office visit he had pants on so unsure how his legs were visualized. RN discussed with KIM Cr. Vascular ultrasound ordered. Patient reports he is now using his CPAP at night.   "

## 2025-05-14 ENCOUNTER — HOSPITAL ENCOUNTER (OUTPATIENT)
Dept: VASCULAR MEDICINE | Facility: HOSPITAL | Age: 75
Discharge: HOME | End: 2025-05-14
Payer: MEDICARE

## 2025-05-14 DIAGNOSIS — R60.0 BILATERAL LEG EDEMA: ICD-10-CM

## 2025-05-14 PROCEDURE — 93970 EXTREMITY STUDY: CPT

## 2025-05-14 PROCEDURE — 93970 EXTREMITY STUDY: CPT | Performed by: INTERNAL MEDICINE

## 2025-05-15 ENCOUNTER — TELEPHONE (OUTPATIENT)
Dept: CARDIOLOGY | Facility: HOSPITAL | Age: 75
End: 2025-05-15
Payer: MEDICARE

## 2025-05-15 ENCOUNTER — APPOINTMENT (OUTPATIENT)
Dept: CARDIOLOGY | Facility: HOSPITAL | Age: 75
End: 2025-05-15
Payer: MEDICARE

## 2025-05-15 ENCOUNTER — ANCILLARY PROCEDURE (OUTPATIENT)
Dept: CARDIOLOGY | Facility: HOSPITAL | Age: 75
End: 2025-05-15
Payer: MEDICARE

## 2025-05-15 DIAGNOSIS — R60.0 LOWER LEG EDEMA: ICD-10-CM

## 2025-05-15 DIAGNOSIS — R00.0 TACHYCARDIA: Primary | ICD-10-CM

## 2025-05-15 DIAGNOSIS — R00.2 PALPITATIONS: ICD-10-CM

## 2025-05-15 DIAGNOSIS — R00.2 PALPITATIONS: Primary | ICD-10-CM

## 2025-05-15 DIAGNOSIS — I87.2 VENOUS INSUFFICIENCY (CHRONIC) (PERIPHERAL): Primary | ICD-10-CM

## 2025-05-15 PROCEDURE — 93246 EXT ECG>7D<15D RECORDING: CPT

## 2025-05-15 NOTE — PROGRESS NOTES
Florinda SIMMONS placed holter monitor on pt on 05/15/25     Pt was explained on what they can and cannot do while wearing the monitor for 14 days.    Pt verbalized understanding.

## 2025-05-15 NOTE — TELEPHONE ENCOUNTER
5/15/25  1100  Called ultrasound results to patient and plan for compression stockings 18-30 mmhg and referral to vascular surgery.    Patient verbalized understanding of results and was agreeable to compression stockings but declined referral to vascular surgery at this time.      ----- Message from Donna Cr sent at 5/15/2025  7:52 AM EDT -----  Has right leg venous reflux.  Recommend knee-high compression stockings. 18 to 30 mmHg. Apply upon awakening and remove at bedtime. Refer to vascular surgery for further evaluation and   recommendations.   ----- Message -----  From: Sudheer, Syngo - Cardiology Results In  Sent: 5/14/2025   6:17 PM EDT  To: Donna Cr, JEANNA-CNP

## 2025-05-30 ENCOUNTER — HOSPITAL ENCOUNTER (OUTPATIENT)
Dept: RADIOLOGY | Facility: EXTERNAL LOCATION | Age: 75
Discharge: HOME | End: 2025-05-30

## 2025-05-30 ENCOUNTER — OFFICE VISIT (OUTPATIENT)
Dept: ORTHOPEDIC SURGERY | Facility: HOSPITAL | Age: 75
End: 2025-05-30
Payer: MEDICARE

## 2025-05-30 VITALS — WEIGHT: 229 LBS | BODY MASS INDEX: 32.78 KG/M2 | HEIGHT: 70 IN

## 2025-05-30 DIAGNOSIS — M16.11 ARTHRITIS OF RIGHT HIP: ICD-10-CM

## 2025-05-30 PROCEDURE — 1125F AMNT PAIN NOTED PAIN PRSNT: CPT | Performed by: FAMILY MEDICINE

## 2025-05-30 PROCEDURE — 99214 OFFICE O/P EST MOD 30 MIN: CPT | Performed by: FAMILY MEDICINE

## 2025-05-30 PROCEDURE — 1159F MED LIST DOCD IN RCRD: CPT | Performed by: FAMILY MEDICINE

## 2025-05-30 PROCEDURE — 99214 OFFICE O/P EST MOD 30 MIN: CPT | Mod: 25 | Performed by: FAMILY MEDICINE

## 2025-05-30 PROCEDURE — 2500000004 HC RX 250 GENERAL PHARMACY W/ HCPCS (ALT 636 FOR OP/ED): Performed by: FAMILY MEDICINE

## 2025-05-30 PROCEDURE — 20611 DRAIN/INJ JOINT/BURSA W/US: CPT | Mod: RT | Performed by: FAMILY MEDICINE

## 2025-05-30 RX ORDER — LIDOCAINE HYDROCHLORIDE 10 MG/ML
4 INJECTION, SOLUTION INFILTRATION; PERINEURAL
Status: COMPLETED | OUTPATIENT
Start: 2025-05-30 | End: 2025-05-30

## 2025-05-30 RX ORDER — TRIAMCINOLONE ACETONIDE 40 MG/ML
80 INJECTION, SUSPENSION INTRA-ARTICULAR; INTRAMUSCULAR
Status: COMPLETED | OUTPATIENT
Start: 2025-05-30 | End: 2025-05-30

## 2025-05-30 RX ADMIN — TRIAMCINOLONE ACETONIDE 80 MG: 400 INJECTION, SUSPENSION INTRA-ARTICULAR; INTRAMUSCULAR at 11:49

## 2025-05-30 RX ADMIN — LIDOCAINE HYDROCHLORIDE 4 ML: 10 INJECTION, SOLUTION INFILTRATION; PERINEURAL at 11:49

## 2025-05-30 ASSESSMENT — PAIN - FUNCTIONAL ASSESSMENT: PAIN_FUNCTIONAL_ASSESSMENT: 0-10

## 2025-05-30 ASSESSMENT — PAIN SCALES - GENERAL: PAINLEVEL_OUTOF10: 4

## 2025-05-30 ASSESSMENT — PAIN DESCRIPTION - DESCRIPTORS: DESCRIPTORS: ACHING

## 2025-05-30 NOTE — PROGRESS NOTES
** Please excuse any errors in grammar or translation related to this dictation. Voice recognition software was utilized to prepare this document. **    Assessment & Plan:  Patient here for ongoing management of right hip arthritis.  Received positive response to steroid injection completed in February.  Non-operative treatment options reviewed below.  - Maintaining a healthy weight: Every pound of bodyweight is about 4-5 pounds through the lower extremity.   - Activity modifications as needed to include use of cane/walker or braces.  - Analgesics to include but not limited to Tylenol up to 3g daily.  - Steroid injection.  Elected to have ultrasound-guided right hip joint intra-articular injection repeated today.  - Referral to arthroplastic surgeon for potential joint replacement.  Patient declines need at this time given his improved symptoms with injections.  Informed patient that steroid injection can be repeated every 3 or more months as symptoms dictate.  Follow-up as needed for ongoing management. All questions answered and patient is agreeable to this plan.       Chief complaint:  Right hip pain    HPI:  5/31/25: Patient plan for right hip arthritis.  He reports a steroid injection completed February helped nearly 100% for close to 3 months.  Pain is increased over the past 1 to 2 weeks.  He can to be focal at his groin.  No interval injury reported.    02/24/25: Patient following up for right hip pain.  He was last seen for this in November 2024.  He reports intra-articular steroid injection helped immensely.  He is now able to ambulate with minimal discomfort without use of cane or walker which he had required previously.  Mild pain restarted in the past 1 to 2 weeks without subsequent injury.  He had been scheduled to speak with an orthopedic surgeon about his hip but canceled the appointment given the success he had with the injection.      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  This complaint has been ongoing for since mid October 2024.  Started after he was getting off he  left leg catching on it and twisting and hoping on right leg to catch his balance.  He was seen at Urgent Care and ER, has had xray, CT, and MRI.  Pain is most prominent at lateral and posterior hip though it is also present at groin intermittently.  He was having sciatic pain down the posterior leg but that has resolved. No reported sensory changes or weakness, joint locking, or feeling of instability. Symptoms are aggravated by walking, figure 4 position, putting on shoes and socks. To date, patient has tried a variety of treatments to include aleve and tylenol, cane with little sustained effect. Previously saw Dr. Blake Crabtree for this with last visit being 11/6/24.  Denies previous surgery to this site.     Patient Active Problem List   Diagnosis    Abnormal electrocardiography    Mixed anxiety depressive disorder    Benign essential hypertension    Gout    Hyperlipidemia    Obstructive sleep apnea syndrome    Dyspnea on exertion    Tachycardia    Detached retina    Cellulitis    Disease due to severe acute respiratory syndrome coronavirus 2 (SARS-CoV-2)    Elevated coronary artery calcium score     Past Surgical History:   Procedure Laterality Date    OTHER SURGICAL HISTORY  07/22/2021    Eye surgery     Current Outpatient Medications on File Prior to Visit   Medication Sig Dispense Refill    acetaminophen (Tylenol) 500 mg tablet Take 2 tablets (1,000 mg) by mouth once daily at bedtime.      amLODIPine (Norvasc) 10 mg tablet Take 0.5 tablets (5 mg) by mouth once daily. 45 tablet 3    aspirin 81 mg EC tablet Take 1 tablet (81 mg) by mouth once daily.      atorvastatin (Lipitor) 20 mg tablet Take 1 tablet (20 mg) by mouth once daily at bedtime.      busPIRone (Buspar) 10 mg tablet Take 1 tablet (10 mg) by mouth 2 times a day.      carbidopa-levodopa (Sinemet CR)  mg ER tablet Take 1  tablet by mouth 2 times a day.      clonazePAM (KlonoPIN) 2 mg tablet Take 1 tablet (2 mg) by mouth once daily. (Patient taking differently: Take 0.5 tablets (1 mg) by mouth once daily.)      colchicine 0.6 mg tablet Take by mouth once daily.      DULoxetine (Cymbalta) 60 mg DR capsule Take 1 capsule (60 mg) by mouth early in the morning..      indomethacin (Indocin) 50 mg capsule TAKE 1 CAPSULE BY MOUTH WITH FOOD OR MILK TWICE DAILY WITH GOUTY SPELL      lisinopril 40 mg tablet Take 1 tablet (40 mg) by mouth once daily.      metoprolol succinate XL (Toprol-XL) 100 mg 24 hr tablet Take 1 tablet (100 mg) by mouth once daily. 90 tablet 3    rOPINIRole (Requip) 0.5 mg tablet TAKE 1 TABLET BY MOUTH EVERY NIGHT 1 TO 3 HOURS BEFORE BEDTIME      tamsulosin (Flomax) 0.4 mg 24 hr capsule Take 1 capsule (0.4 mg) by mouth once daily.       No current facility-administered medications on file prior to visit.     Exam:  General Exam:  Constitutional - NAD, AAO x 3, conversing appropriately.  HEENT- Normocephalic and atraumatic. No facial deformities. Hearing grossly normal.  Lungs - Breathing non-labored with normal rate. No accessory muscle use.  CV - Extremities warm and well-perfused, brisk capillary refill present.   Neuro - CN II-XII grossly intact.    Right Hip Exam:  No warmth, erythema or ecchymosis overlying right hip joint.  Flexion greater than 90 degrees.  IR 20 degrees, ER 40 degrees.  Minimal TTP over greater trochanter.  5/5 strength of hip flexion, abduction, & adduction due to pain  SILT   + JEANE, - ORLIN, - Mannie    Results:  10/28/2024 x-ray right hip revealing moderate degenerative changes of the right hip.     11/1/2024 CT right hip mild to moderate degenerative changes in the right hip without acute fracture though had a ill-defined mild sclerotic change which recommended MRI for further evaluation.    11/7/2024 MRI right femur revealed ligamentum teres avulsion from the femoral head with associated  effusion, possible posterior labral tear of indeterminate age, grade 1 strain right iliopsoas tendinosis, moderate right hip osteoarthritis, mild right hamstring tendinosis      Lab Results   Component Value Date    CREATININE 0.77 01/31/2025    EGFR 93 01/31/2025     Procedure:  Patient ID: Jhon Blair is a 74 y.o. male.    L Inj/Asp: R hip joint on 5/30/2025 11:49 AM  Indications: pain  Details: 22 G (spinal) needle, ultrasound-guided anterior approach  Medications: 80 mg triamcinolone acetonide 40 mg/mL; 4 mL lidocaine 10 mg/mL (1 %)  Outcome: tolerated well, no immediate complications    Procedure risk factors to include increased pain, infection, bleeding, neurovascular injury particularly given close proximity to femoral vessels, soft tissue injury, progressive cartilage loss, transient elevation of blood glucose and blood pressure, and adverse reaction to medication were discussed with the patient. Patient understands there is a moderate risk of morbidity from undergoing the procedure.    Injection performed by Dr. Angela [facility] fellow under my direct supervision.  Procedure, treatment alternatives, risks and benefits explained, specific risks discussed. Consent was given by the patient. Immediately prior to procedure a time out was called to verify the correct patient, procedure, equipment, support staff and site/side marked as required. Patient was prepped and draped in the usual sterile fashion.

## 2025-06-03 PROCEDURE — 93248 EXT ECG>7D<15D REV&INTERPJ: CPT | Performed by: INTERNAL MEDICINE

## 2025-07-01 ENCOUNTER — APPOINTMENT (OUTPATIENT)
Dept: VASCULAR SURGERY | Facility: HOSPITAL | Age: 75
End: 2025-07-01
Payer: MEDICARE

## 2025-07-03 ENCOUNTER — APPOINTMENT (OUTPATIENT)
Dept: VASCULAR SURGERY | Facility: HOSPITAL | Age: 75
End: 2025-07-03
Payer: MEDICARE

## 2025-07-15 ENCOUNTER — OFFICE VISIT (OUTPATIENT)
Dept: CARDIOLOGY | Facility: HOSPITAL | Age: 75
End: 2025-07-15
Payer: MEDICARE

## 2025-07-15 VITALS
BODY MASS INDEX: 32.78 KG/M2 | SYSTOLIC BLOOD PRESSURE: 112 MMHG | WEIGHT: 229 LBS | HEIGHT: 70 IN | HEART RATE: 55 BPM | DIASTOLIC BLOOD PRESSURE: 68 MMHG

## 2025-07-15 DIAGNOSIS — R00.2 PALPITATIONS: ICD-10-CM

## 2025-07-15 DIAGNOSIS — I47.10 PAROXYSMAL SUPRAVENTRICULAR TACHYCARDIA: Primary | ICD-10-CM

## 2025-07-15 LAB
ATRIAL RATE: 55 BPM
P AXIS: 79 DEGREES
P OFFSET: 177 MS
P ONSET: 137 MS
PR INTERVAL: 172 MS
Q ONSET: 223 MS
QRS COUNT: 9 BEATS
QRS DURATION: 100 MS
QT INTERVAL: 442 MS
QTC CALCULATION(BAZETT): 422 MS
QTC FREDERICIA: 429 MS
R AXIS: -20 DEGREES
T AXIS: 19 DEGREES
T OFFSET: 444 MS
VENTRICULAR RATE: 55 BPM

## 2025-07-15 PROCEDURE — 1036F TOBACCO NON-USER: CPT | Performed by: INTERNAL MEDICINE

## 2025-07-15 PROCEDURE — 3074F SYST BP LT 130 MM HG: CPT | Performed by: INTERNAL MEDICINE

## 2025-07-15 PROCEDURE — 99203 OFFICE O/P NEW LOW 30 MIN: CPT | Performed by: INTERNAL MEDICINE

## 2025-07-15 PROCEDURE — 3078F DIAST BP <80 MM HG: CPT | Performed by: INTERNAL MEDICINE

## 2025-07-15 PROCEDURE — 93010 ELECTROCARDIOGRAM REPORT: CPT | Performed by: INTERNAL MEDICINE

## 2025-07-15 PROCEDURE — 93005 ELECTROCARDIOGRAM TRACING: CPT | Performed by: INTERNAL MEDICINE

## 2025-07-15 PROCEDURE — 1159F MED LIST DOCD IN RCRD: CPT | Performed by: INTERNAL MEDICINE

## 2025-07-15 NOTE — PROGRESS NOTES
Subjective:  The patient is a 74-year-old white male who presents to discuss management of atrial arrhythmias.  He has a history of hypertension, gout, parkinsonism, and obstructive sleep apnea, though he is not terribly compliant with his CPAP mask.    The patient is known to have frequent atrial ectopy and wore an event monitor in March 2024 that showed a 13.2% PAC burden with up to 12 seconds of atrial tachycardia, as well as a 3% PVC burden..  He has never had atrial fibrillation or flutter, however.  He is noted to have normal left ventricular systolic function (LVEF 55 to 60% by 2024 echocardiogram, no ischemia on a nuclear stress test in 2020, and trivial coronary calcification in April 2024.  The patient was concerned about his event monitor report from 2024 and had a new one ordered, which he wore from 5/15/2025 to 5/29/2025.  This showed an average heart rate of 75 bpm (range 52 to 125 bpm) with a PAC burden of only 3.3%, and up to 19 beats of atrial tachycardia.  The patient has not particularly symptomatic with palpitations, and has not had lightheadedness or syncope.    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  His wife  him after 31 years, but he reconnected with a woman named Jina Watts, who is the  of one of the patient's good college friends.    Current Outpatient Medications   Medication Sig    acetaminophen (Tylenol) 500 mg tablet Take 2 tablets (1,000 mg) by mouth once daily at bedtime.    amLODIPine (Norvasc) 10 mg tablet Take 0.5 tablets (5 mg) by mouth once daily.    aspirin 81 mg EC tablet Take 1 tablet (81 mg) by mouth once daily.    atorvastatin (Lipitor) 20 mg tablet Take 1 tablet (20 mg) by mouth once daily at  "bedtime.    busPIRone (Buspar) 10 mg tablet Take 1 tablet (10 mg) by mouth 2 times a day.    carbidopa-levodopa (Sinemet CR)  mg ER tablet Take 1 tablet by mouth 2 times a day.    clonazePAM (KlonoPIN) 2 mg tablet Take 1 tablet (2 mg) by mouth once daily. (Patient taking differently: Take 0.5 tablets (1 mg) by mouth once daily.)    colchicine 0.6 mg tablet Take by mouth once daily.    DULoxetine (Cymbalta) 60 mg DR capsule Take 1 capsule (60 mg) by mouth early in the morning..    indomethacin (Indocin) 50 mg capsule TAKE 1 CAPSULE BY MOUTH WITH FOOD OR MILK TWICE DAILY WITH GOUTY SPELL    lisinopril 40 mg tablet Take 1 tablet (40 mg) by mouth once daily.    metoprolol succinate XL (Toprol-XL) 100 mg 24 hr tablet Take 1 tablet (100 mg) by mouth once daily.    rOPINIRole (Requip) 0.5 mg tablet TAKE 1 TABLET BY MOUTH EVERY NIGHT 1 TO 3 HOURS BEFORE BEDTIME    tamsulosin (Flomax) 0.4 mg 24 hr capsule Take 1 capsule (0.4 mg) by mouth once daily.     Allergies:  Patient has no known allergies.     Patient Active Problem List  Diagnosis    Abnormal electrocardiography    Mixed anxiety depressive disorder    Benign essential hypertension    Gout    Hyperlipidemia    Obstructive sleep apnea syndrome    Dyspnea on exertion    Tachycardia    Detached retina    Cellulitis    Disease due to severe acute respiratory syndrome coronavirus 2 (SARS-CoV-2)    Elevated coronary artery calcium score     Past Surgical History:  Procedure Laterality Date    OTHER SURGICAL HISTORY  07/22/2021    Eye surgery     Objective:  Vitals:    07/15/25 1344   BP: 112/68   Pulse: 55   Height:     1.778 m (5' 10\")  Weight: 104 kg (229 lb)     Exam:  Gen: Pleasant gentleman in no distress, happy to discuss prop eventual events in his life.  HEENT: Right eye blindness.  Neck: No jugular venous distention or thyromegaly.  Lungs: Clear to auscultation, with no wheezes or rales.  Heart: Regular rhythm with occasional extrasystoles but no murmurs " noted.  Abdomen: Benign, with no organomegaly or masses.  Extremities: Intact distal pulses; no edema.  Neuro: No focal neurologic abnormalities.  Skin: No cutaneous lesions.    EKG: An EKG today shows sinus bradycardia 55 bpm with an RSR' pattern in V1 but is otherwise within normal limits    Impressions:  1.  Hypertension, well-controlled.  2.  Obstructive sleep apnea, with only moderate to CPAP compliance.  3.  Frequent atrial ectopy and up to 19 beats of atrial tachycardia by recent event monitoring.  The atrial ectopy may well be secondary to sleep apnea.  This is of no major concern at present, unless it ultimately triggers atrial fibrillation, in which case much more aggressive therapy will be needed.  I agree with ongoing beta-blocker therapy for now (Toprol- mg daily).  There is no indication for antiarrhythmic therapy or anticoagulant therapy.  4.  Other medical problems, including parkinsonism, gout, and right eye blindness.    Recommendations:  1.  The patient was reassured that he does not need any more aggressive therapy for his atrial ectopy at this point in time.  2.  He was also reassured that his prior PVCs represent a benign problem in the setting of normal LV function and no evidence of coronary disease.  3.  He will follow-up with Dr. Razo or is welcome to be seen by electrophysiology every year or so.  I recommend event monitoring every 2 to 3 years simply to follow his burden of atrial arrhythmias.  He was encouraged to be as compliant as possible with his CPAP therapy, as this may decrease his right heart pressures and minimize his atrial ectopy.  4.  Should the patient ever have documentation of true atrial fibrillation or flutter, he will then need to be considered for anticoagulant therapy, antiarrhythmic therapy, and possible ablation.      Jose Alberto Heard MD